# Patient Record
Sex: FEMALE | Race: WHITE | Employment: FULL TIME | URBAN - METROPOLITAN AREA
[De-identification: names, ages, dates, MRNs, and addresses within clinical notes are randomized per-mention and may not be internally consistent; named-entity substitution may affect disease eponyms.]

---

## 2020-08-12 ENCOUNTER — HOSPITAL ENCOUNTER (OUTPATIENT)
Age: 40
Discharge: HOME | End: 2020-08-14
Payer: COMMERCIAL

## 2020-08-12 ENCOUNTER — OFFICE VISIT (OUTPATIENT)
Dept: FAMILY MEDICINE CLINIC | Age: 40
End: 2020-08-12
Payer: COMMERCIAL

## 2020-08-12 VITALS
WEIGHT: 190 LBS | TEMPERATURE: 98.4 F | SYSTOLIC BLOOD PRESSURE: 124 MMHG | DIASTOLIC BLOOD PRESSURE: 62 MMHG | HEART RATE: 69 BPM | OXYGEN SATURATION: 98 %

## 2020-08-12 LAB
BILIRUBIN, POC: NORMAL
BLOOD URINE, POC: NORMAL
CLARITY, POC: CLEAR
COLOR, POC: YELLOW
CONTROL: NORMAL
GLUCOSE URINE, POC: NORMAL
KETONES, POC: NORMAL
LEUKOCYTE EST, POC: NORMAL
NITRITE, POC: NORMAL
PH, POC: 6.5
PREGNANCY TEST URINE, POC: NORMAL
PROTEIN, POC: NORMAL
SPECIFIC GRAVITY, POC: 1.02
UROBILINOGEN, POC: 1

## 2020-08-12 PROCEDURE — 81025 URINE PREGNANCY TEST: CPT | Performed by: PHYSICIAN ASSISTANT

## 2020-08-12 PROCEDURE — G8427 DOCREV CUR MEDS BY ELIG CLIN: HCPCS | Performed by: PHYSICIAN ASSISTANT

## 2020-08-12 PROCEDURE — 87088 URINE BACTERIA CULTURE: CPT

## 2020-08-12 PROCEDURE — 99214 OFFICE O/P EST MOD 30 MIN: CPT | Performed by: PHYSICIAN ASSISTANT

## 2020-08-12 PROCEDURE — G8421 BMI NOT CALCULATED: HCPCS | Performed by: PHYSICIAN ASSISTANT

## 2020-08-12 PROCEDURE — 1036F TOBACCO NON-USER: CPT | Performed by: PHYSICIAN ASSISTANT

## 2020-08-12 PROCEDURE — 81002 URINALYSIS NONAUTO W/O SCOPE: CPT | Performed by: PHYSICIAN ASSISTANT

## 2020-08-12 RX ORDER — METHYLPREDNISOLONE 4 MG/1
TABLET ORAL
Qty: 1 KIT | Refills: 0 | Status: SHIPPED
Start: 2020-08-12 | End: 2020-11-09 | Stop reason: ALTCHOICE

## 2020-08-12 RX ORDER — METHOCARBAMOL 750 MG/1
750 TABLET, FILM COATED ORAL 4 TIMES DAILY
Qty: 20 TABLET | Refills: 0 | Status: SHIPPED | OUTPATIENT
Start: 2020-08-12 | End: 2020-08-17

## 2020-08-12 SDOH — HEALTH STABILITY: MENTAL HEALTH: HOW OFTEN DO YOU HAVE A DRINK CONTAINING ALCOHOL?: NEVER

## 2020-08-12 NOTE — PROGRESS NOTES
Known Allergies    Social History:     Social History     Tobacco Use    Smoking status: Never Smoker    Smokeless tobacco: Never Used   Substance Use Topics    Alcohol use: Never     Frequency: Never    Drug use: Never       Patient lives at home. Physical Exam:     Vitals:    08/12/20 1556   BP: 124/62   Pulse: 69   Temp: 98.4 °F (36.9 °C)   SpO2: 98%   Weight: 190 lb (86.2 kg)       Exam:  Physical Exam  Nurses note and vital signs reviewed and patient is not hypoxic. General: The patient appears well and in no apparent distress. Patient is resting comfortably on cart. Skin: Warm, dry, no pallor noted. There is no rash noted. Head: Normocephalic, atraumatic. Eye: Normal conjunctiva  Ears, Nose, Mouth, and Throat: Oral mucosa is moist  Cardiovascular: Regular Rate and Rhythm  Respiratory: Patient is in no distress, no accessory muscle use, lungs are clear to auscultation, no wheezing, crackles or rhonchi  Back: Non-tender, no CVA tenderness bilaterally to percussion. GI: Normal bowel sounds, no tenderness to palpation, no masses appreciated. No rebound, guarding, or rigidity noted. Musculoskeletal: The patient has no evidence of calf tenderness, no pitting edema, symmetrical pulses noted bilaterally  Neurological: A&O x4, normal speech        Testing:     Results for orders placed or performed in visit on 08/12/20   POCT urine pregnancy   Result Value Ref Range    Preg Test, Ur neg     Control     POCT Urinalysis no Micro   Result Value Ref Range    Color, UA yellow     Clarity, UA clear     Glucose, UA POC neg     Bilirubin, UA neg     Ketones, UA neg     Spec Grav, UA 1.020     Blood, UA POC trace-lysed     pH, UA 6.5     Protein, UA POC neg     Urobilinogen, UA 1.0     Leukocytes, UA neg     Nitrite, UA neg            Medical Decision Making:     Vital signs reviewed    Past medical history reviewed. Allergies reviewed. Medications reviewed.     Patient on arrival does not appear to be in

## 2020-08-15 LAB — URINE CULTURE, ROUTINE: NORMAL

## 2020-11-09 ENCOUNTER — TELEPHONE (OUTPATIENT)
Dept: ADMINISTRATIVE | Age: 40
End: 2020-11-09

## 2020-11-09 ENCOUNTER — OFFICE VISIT (OUTPATIENT)
Dept: FAMILY MEDICINE CLINIC | Age: 40
End: 2020-11-09
Payer: COMMERCIAL

## 2020-11-09 VITALS
WEIGHT: 189 LBS | HEART RATE: 70 BPM | TEMPERATURE: 97.3 F | OXYGEN SATURATION: 99 % | BODY MASS INDEX: 30.37 KG/M2 | HEIGHT: 66 IN | RESPIRATION RATE: 18 BRPM | DIASTOLIC BLOOD PRESSURE: 72 MMHG | SYSTOLIC BLOOD PRESSURE: 122 MMHG

## 2020-11-09 PROCEDURE — G8427 DOCREV CUR MEDS BY ELIG CLIN: HCPCS | Performed by: PHYSICIAN ASSISTANT

## 2020-11-09 PROCEDURE — G8484 FLU IMMUNIZE NO ADMIN: HCPCS | Performed by: PHYSICIAN ASSISTANT

## 2020-11-09 PROCEDURE — 99213 OFFICE O/P EST LOW 20 MIN: CPT | Performed by: PHYSICIAN ASSISTANT

## 2020-11-09 PROCEDURE — 1036F TOBACCO NON-USER: CPT | Performed by: PHYSICIAN ASSISTANT

## 2020-11-09 PROCEDURE — G8419 CALC BMI OUT NRM PARAM NOF/U: HCPCS | Performed by: PHYSICIAN ASSISTANT

## 2020-11-09 RX ORDER — CEFDINIR 300 MG/1
300 CAPSULE ORAL 2 TIMES DAILY
Qty: 20 CAPSULE | Refills: 0 | Status: SHIPPED | OUTPATIENT
Start: 2020-11-09 | End: 2020-11-19

## 2020-11-09 RX ORDER — METHYLPREDNISOLONE 4 MG/1
TABLET ORAL
Qty: 1 KIT | Refills: 0 | Status: SHIPPED
Start: 2020-11-09 | End: 2020-11-16 | Stop reason: ALTCHOICE

## 2020-11-09 RX ORDER — CHLORPHENIRAMINE MALEATE 4 MG/1
4 TABLET ORAL EVERY 6 HOURS PRN
Qty: 20 TABLET | Refills: 0 | Status: SHIPPED
Start: 2020-11-09 | End: 2020-11-16 | Stop reason: ALTCHOICE

## 2020-11-09 NOTE — PROGRESS NOTES
Never    Drug use: Never       Patient lives at home. Physical Exam:     Vitals:    11/09/20 1641   BP: 122/72   Pulse: 70   Resp: 18   Temp: 97.3 °F (36.3 °C)   SpO2: 99%   Weight: 189 lb (85.7 kg)   Height: 5' 6\" (1.676 m)       Exam:  Physical Exam  Nurse's notes and vital signs reviewed. The patient is not hypoxic. ? General: Alert, no acute distress, patient resting comfortably Patient is not toxic or lethargic. Skin: Warm, intact, no pallor noted. There is no evidence of rash at this time. Head: Normocephalic, atraumatic  Eye: Normal conjunctiva  Ears, Nose, Throat: Right tympanic membrane clear, left tympanic membrane clear. No drainage or discharge noted. No pre- or post-auricular tenderness, erythema, or swelling noted. Nasal congestion  Posterior oropharynx shows erythema but no evidence of tonsillar hypertrophy, or exudate. the uvula is midline. No trismus or drooling is noted. Moist mucous membranes. Neck: No anterior/posterior lymphadenopathy noted. No erythema, no masses, no fluctuance or induration noted. No meningeal signs. Cardiovascular: Regular Rate and Rhythm  Respiratory: No acute distress, no rhonchi, wheezing or crackles noted. No stridor or retractions are noted. Neurological: A&O x4, normal speech  Psychiatric: Cooperative         Testing:           Medical Decision Making:     The patient has been seen and evaluated. The vital signs have been reviewed. The patient does not appear to be toxic or lethargic. Patient will be treated with Omnicef, chlorphentermine and Medrol Dosepak    The patient was educated on the proper dosage of motrin and tylenol and the appropriate intervals of each. The patient is to increase fluid intake over the next several days. The patient is to return to express care or go directly to the emergency department should any of the signs or symptoms worsen. The patient is to followup with primary care physician in 2-3 days for repeat evaluation.  The patient has no other questions or concerns at this time the patient will be discharged home. Clinical Impression:   Governor Neville GONZALEZ was seen today for sinus problem. Diagnoses and all orders for this visit:    Acute non-recurrent sinusitis, unspecified location    Nasal congestion    Other orders  -     cefdinir (OMNICEF) 300 MG capsule; Take 1 capsule by mouth 2 times daily for 10 days  -     methylPREDNISolone (MEDROL DOSEPACK) 4 MG tablet; Take by mouth.  -     chlorpheniramine (ALLER-CHLOR) 4 MG tablet; Take 1 tablet by mouth every 6 hours as needed for Allergies        The patient is to call for any concerns or return if any of the signs or symptoms worsen. The patient is to follow-up with PCP in the next 2-3 days for repeat evaluation repeat assessment or go directly to the emergency department.      SIGNATURE: Ramiro Bartlett III, PA-C

## 2020-11-09 NOTE — TELEPHONE ENCOUNTER
Pt called stating she may have sinus infection. Pt began feeling unwell on Saturday and has the following symptoms:  Sore throat, sneezing, sinus pressure, ear pressure and perhaps a fever.  (she did not take it)    Pt was transferred to office, option 4.

## 2020-11-16 ENCOUNTER — OFFICE VISIT (OUTPATIENT)
Dept: FAMILY MEDICINE CLINIC | Age: 40
End: 2020-11-16
Payer: COMMERCIAL

## 2020-11-16 ENCOUNTER — TELEPHONE (OUTPATIENT)
Dept: ADMINISTRATIVE | Age: 40
End: 2020-11-16

## 2020-11-16 VITALS
RESPIRATION RATE: 16 BRPM | TEMPERATURE: 97.2 F | BODY MASS INDEX: 29.25 KG/M2 | HEIGHT: 66 IN | DIASTOLIC BLOOD PRESSURE: 78 MMHG | OXYGEN SATURATION: 98 % | HEART RATE: 78 BPM | SYSTOLIC BLOOD PRESSURE: 122 MMHG | WEIGHT: 182 LBS

## 2020-11-16 PROCEDURE — G8484 FLU IMMUNIZE NO ADMIN: HCPCS | Performed by: PHYSICIAN ASSISTANT

## 2020-11-16 PROCEDURE — G8427 DOCREV CUR MEDS BY ELIG CLIN: HCPCS | Performed by: PHYSICIAN ASSISTANT

## 2020-11-16 PROCEDURE — G8417 CALC BMI ABV UP PARAM F/U: HCPCS | Performed by: PHYSICIAN ASSISTANT

## 2020-11-16 PROCEDURE — 99213 OFFICE O/P EST LOW 20 MIN: CPT | Performed by: PHYSICIAN ASSISTANT

## 2020-11-16 PROCEDURE — 1036F TOBACCO NON-USER: CPT | Performed by: PHYSICIAN ASSISTANT

## 2020-11-16 RX ORDER — METHOCARBAMOL 750 MG/1
750 TABLET, FILM COATED ORAL 4 TIMES DAILY
Qty: 40 TABLET | Refills: 0 | Status: SHIPPED | OUTPATIENT
Start: 2020-11-16 | End: 2020-11-26

## 2020-11-16 NOTE — TELEPHONE ENCOUNTER
Pt states over weekend she began feeling tremendous amount of pain down her back and legs. Pt states she did not injure herself, she thinks it may be a pinched nerve. Also please be advised,  Pt states cannot come in, she is going for a covid test today.       Thanks

## 2020-11-16 NOTE — TELEPHONE ENCOUNTER
Friday afternoon started feeling \"tingly\" and having some numbness. Saturday and Sunday worsening, unable to walk, waking up in the middle of the night. Patient will come to walk in care.

## 2020-11-18 ENCOUNTER — TELEPHONE (OUTPATIENT)
Dept: PRIMARY CARE CLINIC | Age: 40
End: 2020-11-18

## 2020-11-18 ENCOUNTER — OFFICE VISIT (OUTPATIENT)
Dept: CHIROPRACTIC MEDICINE | Age: 40
End: 2020-11-18
Payer: COMMERCIAL

## 2020-11-18 VITALS
SYSTOLIC BLOOD PRESSURE: 100 MMHG | OXYGEN SATURATION: 98 % | HEART RATE: 66 BPM | DIASTOLIC BLOOD PRESSURE: 62 MMHG | HEIGHT: 66 IN | RESPIRATION RATE: 14 BRPM | BODY MASS INDEX: 29.25 KG/M2 | WEIGHT: 182 LBS | TEMPERATURE: 97.7 F

## 2020-11-18 PROCEDURE — 1036F TOBACCO NON-USER: CPT | Performed by: CHIROPRACTOR

## 2020-11-18 PROCEDURE — G8427 DOCREV CUR MEDS BY ELIG CLIN: HCPCS | Performed by: CHIROPRACTOR

## 2020-11-18 PROCEDURE — 99203 OFFICE O/P NEW LOW 30 MIN: CPT | Performed by: CHIROPRACTOR

## 2020-11-18 PROCEDURE — G8484 FLU IMMUNIZE NO ADMIN: HCPCS | Performed by: CHIROPRACTOR

## 2020-11-18 PROCEDURE — G8417 CALC BMI ABV UP PARAM F/U: HCPCS | Performed by: CHIROPRACTOR

## 2020-11-18 RX ORDER — OXYCODONE HYDROCHLORIDE AND ACETAMINOPHEN 5; 325 MG/1; MG/1
1 TABLET ORAL EVERY 6 HOURS PRN
Qty: 9 TABLET | Refills: 0 | Status: SHIPPED | OUTPATIENT
Start: 2020-11-18 | End: 2020-11-21

## 2020-11-18 ASSESSMENT — ENCOUNTER SYMPTOMS
SHORTNESS OF BREATH: 0
BACK PAIN: 1
BOWEL INCONTINENCE: 0

## 2020-11-18 NOTE — PATIENT INSTRUCTIONS
LUMBAR EXTENSION EXERCISES  TIPS FOR PERFORMING THESE EXERCISES   Centralization:  o The closer the pain is to your spine, the better. (e.g. the pain is better in your knee than  in your foot). o An increase in your low back pain may occur. This is acceptable as long as your  leg symptoms are not increasing. Stop the exercise and let your doctor or therapist know right away if you have any of  these problems:  -Any increase in weakness in your leg or foot.  -Pain begins to travel further down the leg. For any back injury, if you experience a loss of bowel or bladder control, or you develop saddle anesthesia, go to the emergency department of your local hospital.    Saddle anesthesia is a loss of sensation (anesthesia) restricted to the area of the buttocks, perineum and inner surfaces of the thighs. It is frequently associated with the spine-related injury cauda equina syndrome. PRONE EXTENSION (POSITIONED)          o Lie on stomach with pillows under chest for comfort  o   o Hold position for 3-5 mins  o Option: Squeeze your buttocks  together as tightly as possible. PRONE EXTENSION ON ELBOWS    o Keep your back and buttocks relaxed and rise up on elbows as high as  possible. o Concentrate on keeping your hips down  o Repeat exercise ______ times. o Hold position for 3-5 mins      OR     PRONE PRESS-UPS        o Place hands beside shoulders  o Keep your back and buttocks relaxed and use your arms to press  up.  o Concentrate on keeping your hips down  o Push up your upper body as high as possible. o Repeat exercise 10 times. Every waking hour or for \"First Aid\"      OR    STANDING EXTENSION        o Stand with your feet apart and hands on the small of your back with  fingers pointing backwards. o Bend backwards at the waist, supporting the trunk with your hands. o Keep your knees straight.  o Repeat exercise 10 times. o Option: Perform with back against countertop for support. SIDEGLIDE IN STANDING        o Stand at a right angle to the wall about 2 feet out from the  wall.  o Lean your ______ shoulder into the wall.  o Move your ______ hips toward the wall, keeping your legs  together and your knees straight.  o Return to the starting position. o Repeat exercise ______ times. o Hold position for ______ seconds.

## 2020-11-18 NOTE — TELEPHONE ENCOUNTER
I called and spoke with the patient. We schedule an appointment today with chiropractor. Also try to set her up with a MRI.

## 2020-11-18 NOTE — PROGRESS NOTES
[facility]    20  Rocio Gates : 1980 Sex: female  Age: 41 y.o. Patient was referred by Davey Leigh DO    Chief Complaint   Patient presents with    Lower Back Pain     Right sided lower back pain. Has been bothersome since Friday. No known injury. 50/50 back to leg pain  No prior hx of LBP that required tx  Taking percocet, but pain still about 6/10    Sitting still = no pain    Feels like numbness from epidural after giving birth to child. 1 previous chiropractic visit. She was seen through [facility] on Monday and placed on medications. She previously was on a Medrol dose pack for sinus infection. She has been scheduled for an MRI to be performed on Saturday. She has not been working but feels she is able to. She has to drive to [place] which makes things worse. Then she is on her feet. Back Pain   This is a new problem. The current episode started in the past 7 days. The pain is present in the lumbar spine. The quality of the pain is described as aching (Numbness in the leg). The pain radiates to the right foot. The pain is at a severity of 6/10. The symptoms are aggravated by sitting and position (lifting leg, transition from sitting, forward bending). Associated symptoms include leg pain and numbness. Pertinent negatives include no bladder incontinence, bowel incontinence or fever. She has tried ice, heat, muscle relaxant, analgesics and NSAIDs (percocet) for the symptoms. Red Flags:  none    Review of Systems   Constitutional: Negative for chills and fever. Respiratory: Negative for shortness of breath. Gastrointestinal: Negative for bowel incontinence. Genitourinary: Negative for bladder incontinence. Musculoskeletal: Positive for back pain. Neurological: Positive for numbness.          Current Outpatient Medications:     oxyCODONE-acetaminophen (PERCOCET) 5-325 MG per tablet, Take 1 tablet by mouth every 6 hours as needed for abused: Not on file     Forced sexual activity: Not on file   Other Topics Concern    Not on file   Social History Narrative    Not on file       Vitals:    11/18/20 1531   BP: 100/62   Site: Left Upper Arm   Position: Sitting   Pulse: 66   Resp: 14   Temp: 97.7 °F (36.5 °C)   TempSrc: Temporal   SpO2: 98%   Weight: 182 lb (82.6 kg)   Height: 5' 6\" (1.676 m)       EXAM:  Appears well. No acute distress. Oriented x3. Ambulates without difficulty. No antalgia or list.  Able to arise onto the toes and single-leg stance. Lumbar flexion: 60/60 endrange pain and leg pain  Lumbar extension: 20/25  Right lateral flexion: 15/25  Left lateral flexion: 15/25    Reflexes are +2/5 and symmetrical at the Patella and +2 left Achilles, +1 right Achilles. Manual muscle testing reveals: 5/5 strength to the LE indicator muscles. Sensation to light touch is diminished over the right lateral foot compared to left otherwise WNL to the distal lower extremity dermatomes. Posterior tibial pulses 2/4 B/L. Plantar response reveals down-going toes b/l. Valsalva's: Negative   Seated SLR's: Positive Right Lower Extremity  Oconnor's:  Negative Bilateral  Slump:  Positive Right Lower Extremity  Prone Lying: relief of back and leg pain  Prone Lying in extension: Decreased/better  Lateral shifts were unremarkable. Repeated Extensions in Lying x10: Decreased during loading/centralizing after loading  In a seated position, slouch testing increases back and leg pain. Tension relieves    Midline pain: Positive for pain near L4-5 and L5-S1 interspaces. Taut and Tender fibers lumbar paraspinals B/L       Rocio GONZALEZ was seen today for lower back pain. Diagnoses and all orders for this visit:    Acute midline low back pain with right-sided sciatica        Treatment Plan:   I spoke with her regarding my exam findings and treatment options. Likely has right paracentral disc herniation at L4-5 or L5-S1.   We will know more after the MRI on Saturday. I recommended that we start a trial of conservative care today consisting home Rocío exercises. I will plan on seeing her 2 times per week for 3 weeks, then reassess to determine response to care and future options. With consent, I did begin today. Problem/Goals  Decrease pain and/or swelling    Today's Treatment  Therapeutic exercises via MDT were performed today for 10 minutes-4:00-4:10PM with MDT she gets complete relief of leg symptoms. She was provided with a home exercise sheet reinforcing these issues at home. She understands to stop should leg symptoms worsen or she develop weakness in the leg. Should she develop any saddle anesthesia or loss of bowel or bladder habit she needs to go directly to the emergency room. I will see her back next week after the MRI  I spoke to her regarding posttreatment soreness. Should any arise, she  may use ice for 10-15 minutes, over-the-counter NSAIDs or topical gels. Seen By:  Mary Walker DC    * This note was created using voice recognition software.   The note was reviewed, however grammatical errors may exist.

## 2020-11-18 NOTE — TELEPHONE ENCOUNTER
Patient calling her pain is not letting up and getting worse. Starting at lower right buttock down back of right leg having trouble walking in her home from the pain. Also toes on right foot are starting to become numb. She is not sure what to do. She is taking the meds as directed, stretching, hot/cold compress. She is not sure what the ER would due for her if she went there if this is a pinched nerve.

## 2020-11-19 ENCOUNTER — HOSPITAL ENCOUNTER (EMERGENCY)
Age: 40
Discharge: HOME | End: 2020-11-19
Attending: EMERGENCY MEDICINE
Payer: COMMERCIAL

## 2020-11-19 VITALS
HEART RATE: 80 BPM | HEIGHT: 66 IN | TEMPERATURE: 96.6 F | SYSTOLIC BLOOD PRESSURE: 147 MMHG | OXYGEN SATURATION: 100 % | BODY MASS INDEX: 29.25 KG/M2 | RESPIRATION RATE: 16 BRPM | WEIGHT: 182 LBS | DIASTOLIC BLOOD PRESSURE: 82 MMHG

## 2020-11-19 PROCEDURE — 96372 THER/PROPH/DIAG INJ SC/IM: CPT

## 2020-11-19 PROCEDURE — 6360000002 HC RX W HCPCS: Performed by: EMERGENCY MEDICINE

## 2020-11-19 PROCEDURE — 99284 EMERGENCY DEPT VISIT MOD MDM: CPT

## 2020-11-19 RX ORDER — KETOROLAC TROMETHAMINE 30 MG/ML
30 INJECTION, SOLUTION INTRAMUSCULAR; INTRAVENOUS ONCE
Status: COMPLETED | OUTPATIENT
Start: 2020-11-19 | End: 2020-11-19

## 2020-11-19 RX ORDER — ORPHENADRINE CITRATE 30 MG/ML
60 INJECTION INTRAMUSCULAR; INTRAVENOUS ONCE
Status: COMPLETED | OUTPATIENT
Start: 2020-11-19 | End: 2020-11-19

## 2020-11-19 RX ORDER — PREDNISONE 20 MG/1
60 TABLET ORAL DAILY
Qty: 15 TABLET | Refills: 0 | Status: SHIPPED | OUTPATIENT
Start: 2020-11-19 | End: 2020-11-24

## 2020-11-19 RX ORDER — CYCLOBENZAPRINE HCL 10 MG
10 TABLET ORAL 3 TIMES DAILY PRN
Qty: 30 TABLET | Refills: 0 | Status: SHIPPED | OUTPATIENT
Start: 2020-11-19 | End: 2020-11-29

## 2020-11-19 RX ORDER — OXYCODONE HYDROCHLORIDE AND ACETAMINOPHEN 5; 325 MG/1; MG/1
1 TABLET ORAL EVERY 6 HOURS PRN
Qty: 12 TABLET | Refills: 0 | Status: SHIPPED | OUTPATIENT
Start: 2020-11-19 | End: 2020-11-22

## 2020-11-19 RX ADMIN — KETOROLAC TROMETHAMINE 30 MG: 30 INJECTION, SOLUTION INTRAMUSCULAR at 02:57

## 2020-11-19 RX ADMIN — ORPHENADRINE CITRATE 60 MG: 30 INJECTION INTRAMUSCULAR; INTRAVENOUS at 02:58

## 2020-11-19 ASSESSMENT — PAIN SCALES - GENERAL
PAINLEVEL_OUTOF10: 7
PAINLEVEL_OUTOF10: 9

## 2020-11-19 ASSESSMENT — PAIN DESCRIPTION - PAIN TYPE: TYPE: ACUTE PAIN

## 2020-11-19 ASSESSMENT — PAIN DESCRIPTION - LOCATION: LOCATION: BACK;LEG

## 2020-11-19 ASSESSMENT — PAIN DESCRIPTION - ORIENTATION: ORIENTATION: RIGHT

## 2020-11-19 NOTE — ED PROVIDER NOTES
Department of Emergency Medicine   ED  Provider Note  Admit Date/RoomTime: 11/19/2020  2:40 AM  ED Room: 22/22          History of Present Illness:  11/19/20, Time: 2:43 AM EST  Chief Complaint   Patient presents with    Back Pain     radiates down right leg, numbness to middle toes                Kelly Salvador is a 41 y.o. female presenting to the ED for right lower back, beginning approximately  1 week ago. The complaint has been persistent, moderate in severity, and worsened by changing position. Patient states that approximately 1 week ago she began to have right-sided lower back pain that radiated to the buttocks. She states that she was given muscle relaxer and ibuprofen with no improvement. Patient has been evaluated by the chiropractor as well, has an MRI scheduled for Saturday. She states that this morning she was very uncomfortable while attempting to sleep. She states that the burning pain is now radiating down the lateral leg and into the foot, was told that if symptoms worsen she would need evaluated. She denies any weakness to the leg, still describes a dull pain to the midline lumbar spine, no recent fall or trauma. She denies any saddle paresthesia, no urine/fecal incontinence/retention. She has had no fevers, no other discomfort. Review of Systems:   Pertinent positives and negatives are stated within HPI, all other systems reviewed and are negative.        --------------------------------------------- PAST HISTORY ---------------------------------------------  Past Medical History:  has a past medical history of Kidney stones. Past Surgical History:  has a past surgical history that includes Dilation and curettage of uterus (2010). Social History:  reports that she has never smoked. She has never used smokeless tobacco. She reports current alcohol use. She reports that she does not use drugs. Family History: family history is not on file. . Unless otherwise noted, family history is non contributory    The patients home medications have been reviewed. Allergies: Patient has no known allergies. ---------------------------------------------------PHYSICAL EXAM--------------------------------------    Constitutional/General: Alert and oriented x3  Head: Normocephalic and atraumatic  Eyes: PERRL, EOMI, sclera non icteric  Mouth: Oropharynx clear, handling secretions, no trismus, no asymmetry of the posterior oropharynx or uvular edema  Neck: Supple, full ROM, no stridor, no meningeal signs  Respiratory: Lungs clear to auscultation bilaterally, no wheezes, rales, or rhonchi. Not in respiratory distress  Cardiovascular:  Regular rate. Regular rhythm. No murmurs, no aortic murmurs, no gallops, or rubs. 2+ distal pulses. Equal extremity pulses. Chest: No chest wall tenderness  GI:  Abdomen Soft, Non tender, Non distended. No rebound, guarding, or rigidity. No pulsatile masses. Musculoskeletal: Moves all extremities x 4. Warm and well perfused, no clubbing, cyanosis, or edema. Capillary refill <3 seconds  Integument: skin warm and dry. No rashes. Neurologic: GCS 15, no focal deficits, symmetric strength 5/5 in the upper and lower extremities bilaterally  Psychiatric: Normal Affect          -------------------------------------------------- RESULTS -------------------------------------------------  I have personally reviewed all laboratory and imaging results for this patient. Results are listed below.      LABS: (Lab results interpreted by me)  No results found for this visit on 11/19/20.,       RADIOLOGY:  Interpreted by Radiologist unless otherwise specified  No orders to display             ------------------------- NURSING NOTES AND VITALS REVIEWED ---------------------------   The nursing notes within the ED encounter and vital signs as below have been reviewed by myself  BP (!) 147/82   Pulse 80   Temp 96.6 °F (35.9 °C) (Temporal)   Resp 16   Ht 5' 6\" (1.676 m)   Wt voice recognition software.  Every effort was made to ensure accuracy; however, inadvertent computerized transcription errors may be present        Torres Oviedo DO  11/19/20 9160

## 2020-11-21 ENCOUNTER — HOSPITAL ENCOUNTER (OUTPATIENT)
Dept: MRI IMAGING | Age: 40
Discharge: HOME | End: 2020-11-23
Payer: COMMERCIAL

## 2020-11-21 PROCEDURE — 72148 MRI LUMBAR SPINE W/O DYE: CPT

## 2020-11-23 ENCOUNTER — OFFICE VISIT (OUTPATIENT)
Dept: CHIROPRACTIC MEDICINE | Age: 40
End: 2020-11-23
Payer: COMMERCIAL

## 2020-11-23 VITALS — TEMPERATURE: 97 F

## 2020-11-23 PROCEDURE — 98940 CHIROPRACT MANJ 1-2 REGIONS: CPT | Performed by: CHIROPRACTOR

## 2020-11-23 NOTE — PROGRESS NOTES
20  Zaid Gates : 1980 Sex: female  Age: 41 y.o. Chief Complaint   Patient presents with    Back Pain     lower back       HPI: She states after her initial visit with me, she went home and during the night felt severe pain in the right lower extremity. She ended up going to the emergency room. She was placed on some medications and is doing better now. She states actually today her symptoms are minimal.  She did have her MRI and was contacted by Mancil Severance PA with results. She has been referred to neurosurgeon. She did finish her prednisone taper prescribed by the ER. She is taking Flexeril now as well. Current Outpatient Medications:     cyclobenzaprine (FLEXERIL) 10 MG tablet, Take 1 tablet by mouth 3 times daily as needed for Muscle spasms, Disp: 30 tablet, Rfl: 0    predniSONE (DELTASONE) 20 MG tablet, Take 3 tablets by mouth daily for 5 days, Disp: 15 tablet, Rfl: 0    oxaprozin (DAYPRO) 600 MG tablet, Take 1 tablet by mouth 2 times daily for 7 days Take on full stomach and with a full glass of water, Disp: 14 tablet, Rfl: 0    methocarbamol (ROBAXIN-750) 750 MG tablet, Take 1 tablet by mouth 4 times daily for 10 days, Disp: 40 tablet, Rfl: 0    Exam:   Vitals:    20 1629   Temp: 97 °F (36.1 °C)     She appears well. No distress. Alert and oriented x3. Ambulates without assistance. Sensation to light touch is diminished over the right dorsal foot compared to left otherwise WNL. Posterior tibial pulses 2/4. Prone lying relieves symptoms. Prone lying in extension, repeated extensions in lying-decrease symptoms during loading, better after. There are hypertonic and tender fibers noted today in the bilateral lumbar paraspinal muscles. Joint fixation is noted with motion screening at bilateral SI joints. Martha GONZALEZ was seen today for back pain.     Diagnoses and all orders for this visit:    Bulging of lumbar intervertebral disc        Treatment Plan: Reviewed

## 2020-12-02 ENCOUNTER — OFFICE VISIT (OUTPATIENT)
Dept: CHIROPRACTIC MEDICINE | Age: 40
End: 2020-12-02
Payer: COMMERCIAL

## 2020-12-02 VITALS
TEMPERATURE: 97.9 F | OXYGEN SATURATION: 98 % | HEIGHT: 66 IN | BODY MASS INDEX: 29.25 KG/M2 | HEART RATE: 85 BPM | RESPIRATION RATE: 14 BRPM | WEIGHT: 182 LBS

## 2020-12-02 PROCEDURE — 1036F TOBACCO NON-USER: CPT | Performed by: CHIROPRACTOR

## 2020-12-02 PROCEDURE — G8484 FLU IMMUNIZE NO ADMIN: HCPCS | Performed by: CHIROPRACTOR

## 2020-12-02 PROCEDURE — G8417 CALC BMI ABV UP PARAM F/U: HCPCS | Performed by: CHIROPRACTOR

## 2020-12-02 PROCEDURE — 99213 OFFICE O/P EST LOW 20 MIN: CPT | Performed by: CHIROPRACTOR

## 2020-12-02 PROCEDURE — G8427 DOCREV CUR MEDS BY ELIG CLIN: HCPCS | Performed by: CHIROPRACTOR

## 2020-12-02 NOTE — PROGRESS NOTES
20  Ad Gates : 1980 Sex: female  Age: 41 y.o. Chief Complaint   Patient presents with    Lower Back Pain       HPI:   Pain is has improved. She states since I last saw her, she may have had 1 or 2 episodes of pain which traveled into the right lateral thigh. She has decreased her exercises. She is feeling pretty good. Wonders about walking again for exercise. She has been traveling in the car with a towel behind her back which helps. She does have an area of numbness identified in the right lower leg laterally and in the dorsum of the foot. She has not seen either neurosurgery or pain management yet. Current Outpatient Medications:     oxaprozin (DAYPRO) 600 MG tablet, Take 1 tablet by mouth 2 times daily for 7 days Take on full stomach and with a full glass of water, Disp: 14 tablet, Rfl: 0    Exam:   Vitals:    20 1520   Pulse: 85   Resp: 14   Temp: 97.9 °F (36.6 °C)   SpO2: 98%     She appears well. No distress. She squats fully and arises without pain. She exhibits full, active pain-free range of motion of the lumbar spine. She has mild midline tenderness L4-5 interspace today. Reflexes are +2 and symmetrical at the knees and ankles. Plantar response reveals downgoing toes. Posterior tibial pulses 2/4. There is a loss of sensation first interspace right foot compared to left. Otherwise sensation WNL to the distal lower extremities. EHL strength +4/5 right versus 5/5 left; otherwise 5/5 throughout the lower extremities. Seated and supine SLRs are unremarkable. Slump testing is negative bilaterally. Kemps testing negative bilaterally. Bowstring's and braggard's are both negative bilaterally. Chloe's testing to the right is mildly provocative, unremarkable left. Rocio GONZALEZ was seen today for lower back pain.     Diagnoses and all orders for this visit:    Bulging of lumbar intervertebral disc    Acute midline low back pain with right-sided

## 2021-01-07 ENCOUNTER — INITIAL CONSULT (OUTPATIENT)
Dept: NEUROSURGERY | Age: 41
End: 2021-01-07
Payer: COMMERCIAL

## 2021-01-07 DIAGNOSIS — M51.26 LUMBAR DISC HERNIATION: Primary | ICD-10-CM

## 2021-01-07 PROCEDURE — 1036F TOBACCO NON-USER: CPT | Performed by: PHYSICIAN ASSISTANT

## 2021-01-07 PROCEDURE — G8417 CALC BMI ABV UP PARAM F/U: HCPCS | Performed by: PHYSICIAN ASSISTANT

## 2021-01-07 PROCEDURE — G8428 CUR MEDS NOT DOCUMENT: HCPCS | Performed by: PHYSICIAN ASSISTANT

## 2021-01-07 PROCEDURE — G8484 FLU IMMUNIZE NO ADMIN: HCPCS | Performed by: PHYSICIAN ASSISTANT

## 2021-01-07 PROCEDURE — 99203 OFFICE O/P NEW LOW 30 MIN: CPT | Performed by: PHYSICIAN ASSISTANT

## 2021-01-07 ASSESSMENT — ENCOUNTER SYMPTOMS
GASTROINTESTINAL NEGATIVE: 1
RESPIRATORY NEGATIVE: 1
EYES NEGATIVE: 1
BACK PAIN: 1
ALLERGIC/IMMUNOLOGIC NEGATIVE: 1

## 2021-01-07 NOTE — PROGRESS NOTES
Subjective:      Patient ID: Aislinn Clements is a 41 y.o. female. Back Pain  This is a new problem. Episode onset: 2 months. The problem has been resolved since onset. The pain is present in the lumbar spine. The quality of the pain is described as aching. The patient is experiencing no pain. (Right leg numbness that has resolved) She has tried heat and muscle relaxant (prednisone, percocet) for the symptoms. The treatment provided mild relief. Review of Systems   Constitutional: Negative. HENT: Negative. Eyes: Negative. Respiratory: Negative. Cardiovascular: Negative. Gastrointestinal: Negative. Endocrine: Negative. Genitourinary: Negative. Musculoskeletal: Positive for back pain. Skin: Negative. Allergic/Immunologic: Negative. Neurological: Negative. Hematological: Negative. Psychiatric/Behavioral: Negative. Objective:   Physical Exam  Constitutional:       Appearance: Normal appearance. HENT:      Head: Normocephalic and atraumatic. Nose: Nose normal.   Eyes:      Pupils: Pupils are equal, round, and reactive to light. Pulmonary:      Effort: Pulmonary effort is normal.   Abdominal:      General: There is no distension. Skin:     General: Skin is warm and dry. Neurological:      Mental Status: She is alert. GCS: GCS eye subscore is 4. GCS verbal subscore is 5. GCS motor subscore is 6. Cranial Nerves: Cranial nerves are intact. Sensory: Sensation is intact. Motor: Motor function is intact. Gait: Gait is intact. Deep Tendon Reflexes: Reflexes are normal and symmetric. Psychiatric:         Mood and Affect: Mood normal.         Assessment:      41year old female who had a 1 week of severe low back pain 2 months ago that has resolved. Lumbar MRI reveals normal alignment. No significant disc herniation or stenosis. Plan:      No surgery indicated as her pain has resolved.         NAIF Salazar

## 2021-01-12 ENCOUNTER — OFFICE VISIT (OUTPATIENT)
Dept: PAIN MANAGEMENT | Age: 41
End: 2021-01-12
Payer: COMMERCIAL

## 2021-01-12 VITALS
RESPIRATION RATE: 16 BRPM | BODY MASS INDEX: 29.41 KG/M2 | TEMPERATURE: 97.5 F | SYSTOLIC BLOOD PRESSURE: 124 MMHG | DIASTOLIC BLOOD PRESSURE: 82 MMHG | WEIGHT: 183 LBS | HEIGHT: 66 IN | HEART RATE: 78 BPM

## 2021-01-12 DIAGNOSIS — M54.16 LUMBAR RADICULOPATHY: ICD-10-CM

## 2021-01-12 DIAGNOSIS — G89.4 CHRONIC PAIN SYNDROME: Primary | ICD-10-CM

## 2021-01-12 DIAGNOSIS — G89.29 CHRONIC RIGHT-SIDED LOW BACK PAIN WITH RIGHT-SIDED SCIATICA: ICD-10-CM

## 2021-01-12 DIAGNOSIS — G89.4 CHRONIC PAIN SYNDROME: ICD-10-CM

## 2021-01-12 DIAGNOSIS — M51.9 LUMBAR DISC DISORDER: ICD-10-CM

## 2021-01-12 DIAGNOSIS — M54.41 CHRONIC RIGHT-SIDED LOW BACK PAIN WITH RIGHT-SIDED SCIATICA: ICD-10-CM

## 2021-01-12 LAB — SPECIFIC GRAVITY UA: 1.02 (ref 1–1.03)

## 2021-01-12 PROCEDURE — G8484 FLU IMMUNIZE NO ADMIN: HCPCS | Performed by: PAIN MEDICINE

## 2021-01-12 PROCEDURE — 99204 OFFICE O/P NEW MOD 45 MIN: CPT

## 2021-01-12 PROCEDURE — 99204 OFFICE O/P NEW MOD 45 MIN: CPT | Performed by: PAIN MEDICINE

## 2021-01-12 PROCEDURE — G8417 CALC BMI ABV UP PARAM F/U: HCPCS | Performed by: PAIN MEDICINE

## 2021-01-12 PROCEDURE — G8427 DOCREV CUR MEDS BY ELIG CLIN: HCPCS | Performed by: PAIN MEDICINE

## 2021-01-12 PROCEDURE — 1036F TOBACCO NON-USER: CPT | Performed by: PAIN MEDICINE

## 2021-01-12 RX ORDER — GABAPENTIN 100 MG/1
CAPSULE ORAL
Qty: 69 CAPSULE | Refills: 0 | Status: SHIPPED
Start: 2021-01-12 | End: 2021-02-09 | Stop reason: SDUPTHER

## 2021-01-12 NOTE — PROGRESS NOTES
[facility]        [address]  [facility], [address] [place] [address]  Dept: [phone]        Patient:  Светлана Jaramillo,  1980    Date of Service:  21     Requesting Physician:  Delmar Ferraro    Reason for Consult:      Patient presents with complaints of right back pain that started 3 months ago    HISTORY OF PRESENT ILLNESS:      Pain is intermittent and is described as burning, sharp, pulling. Pain does radiate to right leg. She  has numbness, tingling, weakness of the right leg and does not have bladder or bowel dysfunction. Alleviating factors include: rest.  Aggravating factors include:  movement. She has not been on anticoagulation medications to include ASA, NSAIDS, Plavix, heparin, LMW heparin and warfarin and has not been on herbal supplements. She is not diabetic. Imagin lumbar MRI -  FINDINGS:   No fracture or malalignment of the lumbar spine.  Vertebral body height is   well maintained.  Conus medullaris is visualized and appears unremarkable. No epidural mass or hematoma. L1/L2: No central canal stenosis or neural foraminal narrowing. L2/L3: No central canal stenosis or neural foraminal narrowing.  Mild facet   hypertrophy present. L3/L4: Left foraminal broad-based disc herniation results in moderate left   neural foraminal narrowing.  No central canal stenosis. L4/L5: Mild circumferential disc bulge.  Mild right neural foraminal   narrowing with minimal effacement of exiting right L4 nerve root.  Moderate   bilateral facet hypertrophy notable on the right. L5/S1: No central canal stenosis or significant neural foraminal narrowing.       Impression   1.  Broad-based left foraminal disc bulge at L3/L4 results in moderate left   neural foraminal narrowing. 2.  Mild right neural foraminal narrowing at L4/L5 results in minimal   effacement of exiting right L4 nerve root. 3.  Moderate facet hypertrophy at L4/L5 more notable on the right. Previous treatments: chiro and medications. Past Medical History:   Diagnosis Date    Depression     Kidney stones        Past Surgical History:   Procedure Laterality Date    DILATION AND CURETTAGE OF UTERUS  2010       Prior to Admission medications    Not on File       No Known Allergies    Social History     Socioeconomic History    Marital status: Unknown     Spouse name: Not on file    Number of children: Not on file    Years of education: Not on file    Highest education level: Not on file   Occupational History    Not on file   Social Needs    Financial resource strain: Not on file    Food insecurity     Worry: Not on file     Inability: Not on file    Transportation needs     Medical: Not on file     Non-medical: Not on file   Tobacco Use    Smoking status: Former Smoker     Packs/day: 2.00     Years: 10.00     Pack years: 20.00     Quit date: 2006     Years since quitting: 15.0    Smokeless tobacco: Never Used   Substance and Sexual Activity    Alcohol use:  Yes     Alcohol/week: 3.0 standard drinks     Types: 3 Glasses of wine per week     Frequency: Never     Comment: occassional    Drug use: Never    Sexual activity: Not on file   Lifestyle    Physical activity     Days per week: Not on file     Minutes per session: Not on file    Stress: Not on file   Relationships    Social connections     Talks on phone: Not on file     Gets together: Not on file     Attends Nondenominational service: Not on file     Active member of club or organization: Not on file     Attends meetings of clubs or organizations: Not on file     Relationship status: Not on file    Intimate partner violence     Fear of current or ex partner: Not on file     Emotionally abused: Not on file     Physically abused: Not on file     Forced sexual activity: Not on file   Other Topics Concern    Not on file   Social History Narrative    Not on file Family History   Problem Relation Age of Onset    Hypertension Mother     No Known Problems Father     Cancer Paternal Aunt     Coronary Art Dis Maternal Grandmother        REVIEW OF SYSTEMS:     Patient specifically denies fever/chills, chest pain, shortness of breath, new bowel or bladder complaints. All other review of systems was negative. PHYSICAL EXAMINATION:      /82   Pulse 78   Temp 97.5 °F (36.4 °C)   Resp 16   Ht 5' 6\" (1.676 m)   Wt 183 lb (83 kg)   BMI 29.54 kg/m²     General:      General appearance:pleasant and well-hydrated, in no distress and A & O x3  Build:Normal Weight  Function:Rises from a seated position easily. HEENT:    Head:normocephalic, atraumatic  Pupils:regular, round, equal  Sclera: icterus absent    Lungs:    Breathing:normal breathing pattern    Abdomen:    Shape:non-distended and normal  Tenderness:none  Guarding:none    Lumbar spine:    Spine inspection:normal   CVA tenderness:No   Palpation:tenderness paravertebral muscles, left, right and positive. Range of motion:abnormal mildly in lateral bending, flexion, extension rotation bilateral and is painful. Musculoskeletal:    Trigger points in Paraveteral:absent bilaterally  SI joint tenderness:negative right, negative left              JAMARI test:not done right, not done             left  Piriformis tenderness:negative right, negative left  Trochanteric bursa tenderness:negative right, negative left  SLR:negative right, negative left, sitting     Extremities:    Tremors:None bilaterally upper and lower  Range of motion:pain with internal rotation of hips negative.   Intact:Yes  Varicose veins:absent   Pulses:present Lt radial  Cyanosis:none  Edema:none x all 4 extremities    Neurological:    Sensory:normal to light touch BLE except RLE in L5 distribution  Motor:                Right Quadriceps5/5          Left Quadriceps5/5           Right Gastrocnemius5/5    Left Gastrocnemius5/5  Right Ant Tibialis5/5 Left Ant Tibialis5/5    Reflexes:    Right Quadriceps reflex2+  Left Quadriceps reflex2+  Right Achilles reflex2+  Left Achilles reflex2+  Gait:normal    Dermatology:    Skin:no rashes or lesions noted    Impression:    LBP RLE pain in L4 and L5 distribution, initially severe in November but has improved since that time and is now intermittent  2020 lumbar MRI shows mild foraminal narrowing on the right affecting the L4 nerve root, moderate narrowing on the left at L3-4  Works for the [employer], , 2 children agres 15 and 10    Plan:    Reviewed referral documents and imaging  Urine screen today  OARRS report reviewed  Gabapentin 100 mg titration as tolerated up to tid, discussed common SE's  PT  Consider injection prn  Patient encouraged to stay active and to lose weight  Treatment plan discussed with the patient including medication and procedure side effects     CC:  Referring physician    SHER Goldsmith.

## 2021-01-12 NOTE — PROGRESS NOTES
Do you currently have any of the following:    Fever: No  Headache:  No  Cough: No  Shortness of breath: No  Exposed to anyone with these symptoms: No         Josi Chaparro Gates presents to the [facility]12[facility]. Rocio GONZALEZ is complaining of pain in her lower back and down the right leg. The pain is intermittent. The pain is described as sharp, burning, numb and pulling. Pain is rated on her best day at a 2, on her worst day at a 10, and on average at a 4 on the VAS scale. She took her last dose of Percocet in November. Pacemaker or defibrillator: No managed by N/A. She is not on NSAIDS and is not on anticoagulation medications. /82   Pulse 78   Temp 97.5 °F (36.4 °C)   Resp 16   Ht 5' 6\" (1.676 m)   Wt 183 lb (83 kg)   BMI 29.54 kg/m²      No LMP recorded. Patient has had an implant.

## 2021-01-14 LAB
Lab: NORMAL
REPORT: NORMAL
THIS TEST SENT TO: NORMAL

## 2021-01-15 LAB
7-AMINOCLONAZEPAM, URINE: <5 NG/ML
ALPHA-HYDROXYALPRAZOLAM, URINE: <5 NG/ML
ALPHA-HYDROXYMIDAZOLAM, URINE: <20 NG/ML
ALPRAZOLAM, URINE: <5 NG/ML
CHLORDIAZEPOXIDE, URINE: <20 NG/ML
CLONAZEPAM, URINE: <5 NG/ML
DIAZEPAM, URINE: <20 NG/ML
LORAZEPAM, URINE: <20 NG/ML
MIDAZOLAM, URINE: <20 NG/ML
NORDIAZEPAM, URINE: <20 NG/ML
OXAZEPAM, URINE: <20 NG/ML
TEMAZEPAM, URINE: <20 NG/ML

## 2021-01-16 LAB
6AM URINE: <10 NG/ML
CODEINE, URINE: <20 NG/ML
HYDROCODONE, URINE: <20 NG/ML
HYDROMORPHONE, URINE: <20 NG/ML
MORPHINE URINE: <20 NG/ML
NORHYDROCODONE, URINE: <20 NG/ML
NOROXYCODONE, URINE: <20 NG/ML
NOROXYMORPHONE, URINE: <20 NG/ML
OXYCODONE, URINE CONFIRMATION: <20 NG/ML
OXYMORPHONE, URINE: <20 NG/ML

## 2021-02-05 NOTE — PROGRESS NOTES
[facility]        [address]  [facility], [address] [facility]  Dept: [phone]        Follow up Note      Aislinn Clements     Date of Visit:  21     CC:  Patient presents for follow up   Chief Complaint   Patient presents with    Follow-up     lower back     HPI:    Pain is unchanged. Change in quality of symptoms:no. Medication side effects:none. Recent diagnostic testing:none. Recent interventional procedures:none. She has not been on anticoagulation medications to include ASA, NSAIDS, Plavix, heparin, LMW heparin and warfarin and has not been on herbal supplements. She is not diabetic.     Imagin lumbar MRI -  FINDINGS:   No fracture or malalignment of the lumbar spine.  Vertebral body height is   well maintained.  Conus medullaris is visualized and appears unremarkable. No epidural mass or hematoma. L1/L2: No central canal stenosis or neural foraminal narrowing. L2/L3: No central canal stenosis or neural foraminal narrowing.  Mild facet   hypertrophy present. L3/L4: Left foraminal broad-based disc herniation results in moderate left   neural foraminal narrowing.  No central canal stenosis. L4/L5: Mild circumferential disc bulge.  Mild right neural foraminal   narrowing with minimal effacement of exiting right L4 nerve root.  Moderate   bilateral facet hypertrophy notable on the right. L5/S1: No central canal stenosis or significant neural foraminal narrowing.       Impression   1.  Broad-based left foraminal disc bulge at L3/L4 results in moderate left   neural foraminal narrowing. 2.  Mild right neural foraminal narrowing at L4/L5 results in minimal   effacement of exiting right L4 nerve root.    3.  Moderate facet hypertrophy at L4/L5 more notable on the right.         Potential Aberrant Drug-Related Behavior: no    Urine Drug Screenin2021 consistent    OARRS report:  2021 consistent    Past Medical History:   Diagnosis Date  Depression     Kidney stones        Past Surgical History:   Procedure Laterality Date    DILATION AND CURETTAGE OF UTERUS  2010       Prior to Admission medications    Medication Sig Start Date End Date Taking? Authorizing Provider   gabapentin (NEURONTIN) 100 MG capsule qHS x 7 days then BID x 7 days then TID thereafter 1/12/21 2/11/21  Emre Rivers, DO       No Known Allergies    Social History     Socioeconomic History    Marital status: Unknown     Spouse name: Not on file    Number of children: Not on file    Years of education: Not on file    Highest education level: Not on file   Occupational History    Not on file   Social Needs    Financial resource strain: Not on file    Food insecurity     Worry: Not on file     Inability: Not on file    Transportation needs     Medical: Not on file     Non-medical: Not on file   Tobacco Use    Smoking status: Former Smoker     Packs/day: 2.00     Years: 10.00     Pack years: 20.00     Quit date: 2006     Years since quitting: 15.1    Smokeless tobacco: Never Used   Substance and Sexual Activity    Alcohol use:  Yes     Alcohol/week: 3.0 standard drinks     Types: 3 Glasses of wine per week     Frequency: Never     Comment: occassional    Drug use: Never    Sexual activity: Yes     Partners: Male   Lifestyle    Physical activity     Days per week: Not on file     Minutes per session: Not on file    Stress: Not on file   Relationships    Social connections     Talks on phone: Not on file     Gets together: Not on file     Attends Worship service: Not on file     Active member of club or organization: Not on file     Attends meetings of clubs or organizations: Not on file     Relationship status: Not on file    Intimate partner violence     Fear of current or ex partner: Not on file     Emotionally abused: Not on file     Physically abused: Not on file     Forced sexual activity: Not on file   Other Topics Concern    Not on file Social History Narrative    Not on file       Family History   Problem Relation Age of Onset    Hypertension Mother     No Known Problems Father     Cancer Paternal Aunt     Coronary Art Dis Maternal Grandmother        REVIEW OF SYSTEMS:     Rocio GONZALEZ denies fever/chills, chest pain, shortness of breath, new bowel or bladder complaints. All other review of systems was negative. PHYSICAL EXAMINATION:      /62   Pulse 78   Temp 97.7 °F (36.5 °C) (Infrared)   Resp 16   Ht 5' 6\" (1.676 m)   Wt 185 lb (83.9 kg)   SpO2 98%   BMI 29.86 kg/m²     General:       General appearance:pleasant and well-hydrated, in no distress and A & O x3  Build:Normal Weight  Function:Rises from a seated position easily.     HEENT:     Head:normocephalic, atraumatic  Pupils:regular, round, equal  Sclera: icterus absent     Lungs:     Breathing:normal breathing pattern     Abdomen:     Shape:non-distended and normal  Tenderness:none  Guarding:none     Lumbar spine:     Spine inspection:decreased lordosis  CVA tenderness:No   Palpation:tenderness paravertebral muscles, left, right positive. Range of motion:abnormal mildly in lateral bending, flexion, extension rotation bilateral and is painful.     Musculoskeletal:     Trigger points in Paraveteral:absent bilaterally  SI joint tenderness:negative right, negative left              JAMARI test:not done right, not done left  Piriformis tenderness:negative right, negative left  Trochanteric bursa tenderness:negative right, negative left  SLR:negative right, negative left, sitting      Extremities:     Tremors:None bilaterally upper and lower  Range of motion:pain with internal rotation of hips negative.   Intact:Yes  Varicose veins:absent   Pulses:present Lt radial  Cyanosis:none  Edema:none x all 4 extremities     Neurological:     Sensory:normal to light touch BLE except RLE in L5 distribution  Motor:                Right Quadriceps5/5          Left Quadriceps5/5 Right Gastrocnemius5/5    Left Gastrocnemius5/5  Right Ant Tibialis5/5  Left Ant Tibialis5/5     Reflexes:    Right Quadriceps reflex2+  Left Quadriceps reflex2+  Right Achilles reflex2+  Left Achilles reflex2+  Gait:normal station     Dermatology:     Skin:no rashes or lesions noted     Impression:     LBP RLE pain in L4 and L5 distribution, initially severe in November but has improved since that time and is now intermittent  2020 lumbar MRI shows mild foraminal narrowing on the right affecting the L4 nerve root, moderate narrowing on the left at L3-4  Works for the [employer], , 2 children agres 15 and 10     Plan:     Urine screen and OARRS report reviewed  RF gabapentin 100 mg TID, 2 RF - discussed common SE's (pt will call before next visit if she has any trouble with this medication)  PT - participating but is unaffordable  Consider injection prn  Patient encouraged to stay active  Treatment plan discussed with the patient including medication and procedure side effects     Cc:  Referring physician    SHER Vazquez.

## 2021-02-09 ENCOUNTER — OFFICE VISIT (OUTPATIENT)
Dept: PAIN MANAGEMENT | Age: 41
End: 2021-02-09
Payer: COMMERCIAL

## 2021-02-09 VITALS
TEMPERATURE: 97.7 F | BODY MASS INDEX: 29.73 KG/M2 | SYSTOLIC BLOOD PRESSURE: 115 MMHG | WEIGHT: 185 LBS | RESPIRATION RATE: 16 BRPM | OXYGEN SATURATION: 98 % | HEIGHT: 66 IN | DIASTOLIC BLOOD PRESSURE: 62 MMHG | HEART RATE: 78 BPM

## 2021-02-09 DIAGNOSIS — M51.9 LUMBAR DISC DISORDER: ICD-10-CM

## 2021-02-09 DIAGNOSIS — M54.41 CHRONIC RIGHT-SIDED LOW BACK PAIN WITH RIGHT-SIDED SCIATICA: ICD-10-CM

## 2021-02-09 DIAGNOSIS — G89.4 CHRONIC PAIN SYNDROME: Primary | ICD-10-CM

## 2021-02-09 DIAGNOSIS — G89.29 CHRONIC RIGHT-SIDED LOW BACK PAIN WITH RIGHT-SIDED SCIATICA: ICD-10-CM

## 2021-02-09 DIAGNOSIS — M54.16 LUMBAR RADICULOPATHY: ICD-10-CM

## 2021-02-09 PROCEDURE — G8427 DOCREV CUR MEDS BY ELIG CLIN: HCPCS | Performed by: PAIN MEDICINE

## 2021-02-09 PROCEDURE — G8417 CALC BMI ABV UP PARAM F/U: HCPCS | Performed by: PAIN MEDICINE

## 2021-02-09 PROCEDURE — 1036F TOBACCO NON-USER: CPT | Performed by: PAIN MEDICINE

## 2021-02-09 PROCEDURE — G8484 FLU IMMUNIZE NO ADMIN: HCPCS | Performed by: PAIN MEDICINE

## 2021-02-09 PROCEDURE — 99213 OFFICE O/P EST LOW 20 MIN: CPT | Performed by: PAIN MEDICINE

## 2021-02-09 PROCEDURE — 99214 OFFICE O/P EST MOD 30 MIN: CPT | Performed by: PAIN MEDICINE

## 2021-02-09 RX ORDER — GABAPENTIN 100 MG/1
100 CAPSULE ORAL 3 TIMES DAILY
Qty: 90 CAPSULE | Refills: 2 | Status: SHIPPED
Start: 2021-02-09 | End: 2021-03-30

## 2021-03-30 ENCOUNTER — OFFICE VISIT (OUTPATIENT)
Dept: PRIMARY CARE CLINIC | Age: 41
End: 2021-03-30
Payer: COMMERCIAL

## 2021-03-30 VITALS
SYSTOLIC BLOOD PRESSURE: 124 MMHG | HEIGHT: 66 IN | DIASTOLIC BLOOD PRESSURE: 80 MMHG | HEART RATE: 89 BPM | BODY MASS INDEX: 30.86 KG/M2 | TEMPERATURE: 97.3 F | OXYGEN SATURATION: 98 % | WEIGHT: 192 LBS | RESPIRATION RATE: 18 BRPM

## 2021-03-30 DIAGNOSIS — N92.6 MENSES, IRREGULAR: ICD-10-CM

## 2021-03-30 DIAGNOSIS — Z00.00 ANNUAL PHYSICAL EXAM: Primary | ICD-10-CM

## 2021-03-30 DIAGNOSIS — Z00.00 ANNUAL PHYSICAL EXAM: ICD-10-CM

## 2021-03-30 LAB
ALBUMIN SERPL-MCNC: 4.9 G/DL (ref 3.5–5.2)
ALP BLD-CCNC: 56 U/L (ref 35–104)
ALT SERPL-CCNC: 22 U/L (ref 0–32)
ANION GAP SERPL CALCULATED.3IONS-SCNC: 18 MMOL/L (ref 7–16)
AST SERPL-CCNC: 20 U/L (ref 0–31)
BASOPHILS ABSOLUTE: 0.04 E9/L (ref 0–0.2)
BASOPHILS RELATIVE PERCENT: 0.5 % (ref 0–2)
BILIRUB SERPL-MCNC: 0.6 MG/DL (ref 0–1.2)
BUN BLDV-MCNC: 10 MG/DL (ref 6–20)
CALCIUM SERPL-MCNC: 10.8 MG/DL (ref 8.6–10.2)
CHLORIDE BLD-SCNC: 99 MMOL/L (ref 98–107)
CHOLESTEROL, TOTAL: 187 MG/DL (ref 0–199)
CO2: 22 MMOL/L (ref 22–29)
CREAT SERPL-MCNC: 0.7 MG/DL (ref 0.5–1)
EOSINOPHILS ABSOLUTE: 0.05 E9/L (ref 0.05–0.5)
EOSINOPHILS RELATIVE PERCENT: 0.6 % (ref 0–6)
GFR AFRICAN AMERICAN: >60
GFR NON-AFRICAN AMERICAN: >60 ML/MIN/1.73
GLUCOSE BLD-MCNC: 89 MG/DL (ref 74–99)
HCT VFR BLD CALC: 42.7 % (ref 34–48)
HDLC SERPL-MCNC: 46 MG/DL
HEMOGLOBIN: 13.9 G/DL (ref 11.5–15.5)
IMMATURE GRANULOCYTES #: 0.04 E9/L
IMMATURE GRANULOCYTES %: 0.5 % (ref 0–5)
LDL CHOLESTEROL CALCULATED: 118 MG/DL (ref 0–99)
LYMPHOCYTES ABSOLUTE: 2.82 E9/L (ref 1.5–4)
LYMPHOCYTES RELATIVE PERCENT: 32.3 % (ref 20–42)
MCH RBC QN AUTO: 29.4 PG (ref 26–35)
MCHC RBC AUTO-ENTMCNC: 32.6 % (ref 32–34.5)
MCV RBC AUTO: 90.5 FL (ref 80–99.9)
MONOCYTES ABSOLUTE: 0.63 E9/L (ref 0.1–0.95)
MONOCYTES RELATIVE PERCENT: 7.2 % (ref 2–12)
NEUTROPHILS ABSOLUTE: 5.16 E9/L (ref 1.8–7.3)
NEUTROPHILS RELATIVE PERCENT: 58.9 % (ref 43–80)
PDW BLD-RTO: 12.5 FL (ref 11.5–15)
PLATELET # BLD: 316 E9/L (ref 130–450)
PMV BLD AUTO: 10.8 FL (ref 7–12)
POTASSIUM SERPL-SCNC: 4.1 MMOL/L (ref 3.5–5)
RBC # BLD: 4.72 E12/L (ref 3.5–5.5)
SODIUM BLD-SCNC: 139 MMOL/L (ref 132–146)
T4 TOTAL: 6.8 MCG/DL (ref 4.5–11.7)
TOTAL PROTEIN: 8 G/DL (ref 6.4–8.3)
TRIGL SERPL-MCNC: 116 MG/DL (ref 0–149)
TSH SERPL DL<=0.05 MIU/L-ACNC: 1.92 UIU/ML (ref 0.27–4.2)
VLDLC SERPL CALC-MCNC: 23 MG/DL
WBC # BLD: 8.7 E9/L (ref 4.5–11.5)

## 2021-03-30 PROCEDURE — G8484 FLU IMMUNIZE NO ADMIN: HCPCS | Performed by: FAMILY MEDICINE

## 2021-03-30 PROCEDURE — G8427 DOCREV CUR MEDS BY ELIG CLIN: HCPCS | Performed by: FAMILY MEDICINE

## 2021-03-30 PROCEDURE — G8417 CALC BMI ABV UP PARAM F/U: HCPCS | Performed by: FAMILY MEDICINE

## 2021-03-30 PROCEDURE — 1036F TOBACCO NON-USER: CPT | Performed by: FAMILY MEDICINE

## 2021-03-30 PROCEDURE — 99396 PREV VISIT EST AGE 40-64: CPT | Performed by: FAMILY MEDICINE

## 2021-03-30 ASSESSMENT — ENCOUNTER SYMPTOMS
RESPIRATORY NEGATIVE: 1
GASTROINTESTINAL NEGATIVE: 1
ALLERGIC/IMMUNOLOGIC NEGATIVE: 1
EYES NEGATIVE: 1

## 2021-03-30 NOTE — PROGRESS NOTES
3/30/21     [facility]    : 1980 Sex: female   Age: 35 y.o. Chief Complaint   Patient presents with    Referral - General     needs new GYN    Back Pain     had some issues in november        Prior to Admission medications    Not on File          HPI: Patient seen today health maintenance physical and menstrual irregularities. She admits to having an implant for birth control and had been following locally with Dr. Pablo Jordan. We are going to refer her to Dr. Kenneth Lau. Medically otherwise she has been stable. Baseline labs will be completed today. Plans for mammogram prior to follow-up. Review of Systems   Constitutional: Negative. HENT: Negative. Eyes: Negative. Respiratory: Negative. Gastrointestinal: Negative. Endocrine: Negative. Genitourinary: Negative. Musculoskeletal: Negative. Skin: Negative. Allergic/Immunologic: Negative. Neurological: Negative. Hematological: Negative. Psychiatric/Behavioral: Negative. No current outpatient medications on file. No Known Allergies    Social History     Tobacco Use    Smoking status: Former Smoker     Packs/day: 2.00     Years: 10.00     Pack years: 20.00     Quit date:      Years since quitting: 15.2    Smokeless tobacco: Never Used   Substance Use Topics    Alcohol use:  Yes     Alcohol/week: 3.0 standard drinks     Types: 3 Glasses of wine per week     Frequency: Never     Comment: occassional    Drug use: Never      Past Surgical History:   Procedure Laterality Date    DILATION AND CURETTAGE OF UTERUS  2010     Family History   Problem Relation Age of Onset    Hypertension Mother     No Known Problems Father     Cancer Paternal Aunt     Coronary Art Dis Maternal Grandmother      Past Medical History:   Diagnosis Date    Depression     Kidney stones        Vitals:    21 1613   BP: 124/80   Pulse: 89   Resp: 18   Temp: 97.3 °F (36.3 °C)   SpO2: 98%   Weight: 192 lb (87.1 kg)   Height: 5' 6\" (1.676 m)     BP Readings from Last 3 Encounters:   03/30/21 124/80   02/09/21 115/62   01/12/21 124/82    Body mass index is 30.99 kg/m². Physical Exam  Vitals signs and nursing note reviewed. Constitutional:       Appearance: She is well-developed. HENT:      Head: Normocephalic. Right Ear: External ear normal.      Left Ear: External ear normal.      Nose: Nose normal.   Eyes:      Conjunctiva/sclera: Conjunctivae normal.      Pupils: Pupils are equal, round, and reactive to light. Neck:      Musculoskeletal: Normal range of motion and neck supple. Cardiovascular:      Rate and Rhythm: Normal rate. Pulmonary:      Breath sounds: Normal breath sounds. Abdominal:      General: Bowel sounds are normal.      Palpations: Abdomen is soft. Musculoskeletal: Normal range of motion. Skin:     General: Skin is warm and dry. Neurological:      Mental Status: She is alert and oriented to person, place, and time. Psychiatric:         Behavior: Behavior normal.     Vitals physical exam all stable. Medically has been well. We will plan reassessment next month for review labs and then further discussion on possible weight loss management as well. BMI slightly over 30. Plan Per Assessment:  Rocio GONZALEZ was seen today for referral - general and back pain. Diagnoses and all orders for this visit:    Annual physical exam  -     Florence Bose MD, [facility]  -     CBC Auto Differential; Future  -     Comprehensive Metabolic Panel; Future  -     Lipid Panel; Future  -     T4; Future  -     TSH without Reflex; Future  -     JENNIFER DIGITAL SCREEN BILATERAL PER PROTOCOL; Future  -     CBC Auto Differential; Future  -     Comprehensive Metabolic Panel; Future  -     Lipid Panel; Future  -     T4; Future  -     TSH without Reflex; Future    Menses, irregular            Return in about 4 weeks (around 4/27/2021).       Tiffany Phan DO    Note was generated with the assistance of voice recognition software. Document was reviewed however may contain grammatical errors.

## 2021-04-22 DIAGNOSIS — Z12.31 VISIT FOR SCREENING MAMMOGRAM: Primary | ICD-10-CM

## 2021-04-23 ENCOUNTER — HOSPITAL ENCOUNTER (OUTPATIENT)
Dept: GENERAL RADIOLOGY | Age: 41
Discharge: HOME | End: 2021-04-25
Payer: COMMERCIAL

## 2021-04-23 DIAGNOSIS — Z12.31 VISIT FOR SCREENING MAMMOGRAM: ICD-10-CM

## 2021-04-23 PROCEDURE — 77063 BREAST TOMOSYNTHESIS BI: CPT

## 2021-04-26 ENCOUNTER — TELEPHONE (OUTPATIENT)
Dept: GENERAL RADIOLOGY | Age: 41
End: 2021-04-26

## 2021-04-26 DIAGNOSIS — R92.8 ABNORMAL MAMMOGRAM: Primary | ICD-10-CM

## 2021-04-26 NOTE — TELEPHONE ENCOUNTER
Call to patient in reference to her mammogram performed at [facility] on April 23, 2021. Instructed patient that the radiologist has recommended some additional breast imaging, in order to make a final determination/result. Verbalizes understanding and is agreeable to proceed.        Ty Jones RN, BSN, [address]

## 2021-04-28 ENCOUNTER — HOSPITAL ENCOUNTER (OUTPATIENT)
Dept: GENERAL RADIOLOGY | Age: 41
Discharge: HOME | End: 2021-04-30
Payer: COMMERCIAL

## 2021-04-28 DIAGNOSIS — R92.8 ABNORMAL MAMMOGRAM OF RIGHT BREAST: ICD-10-CM

## 2021-04-28 DIAGNOSIS — R92.8 ABNORMAL MAMMOGRAM: ICD-10-CM

## 2021-04-28 PROCEDURE — 77065 DX MAMMO INCL CAD UNI: CPT

## 2021-04-28 PROCEDURE — 76642 ULTRASOUND BREAST LIMITED: CPT

## 2021-04-29 PROBLEM — Z00.00 ANNUAL PHYSICAL EXAM: Status: RESOLVED | Noted: 2021-03-30 | Resolved: 2021-04-29

## 2021-05-03 ENCOUNTER — OFFICE VISIT (OUTPATIENT)
Dept: PRIMARY CARE CLINIC | Age: 41
End: 2021-05-03
Payer: COMMERCIAL

## 2021-05-03 VITALS
TEMPERATURE: 97.5 F | SYSTOLIC BLOOD PRESSURE: 124 MMHG | HEIGHT: 66 IN | BODY MASS INDEX: 30.86 KG/M2 | OXYGEN SATURATION: 97 % | WEIGHT: 192 LBS | HEART RATE: 90 BPM | DIASTOLIC BLOOD PRESSURE: 86 MMHG

## 2021-05-03 DIAGNOSIS — E83.52 HYPERCALCEMIA: Primary | ICD-10-CM

## 2021-05-03 DIAGNOSIS — R63.5 WEIGHT GAIN: ICD-10-CM

## 2021-05-03 PROCEDURE — 99214 OFFICE O/P EST MOD 30 MIN: CPT | Performed by: FAMILY MEDICINE

## 2021-05-03 PROCEDURE — G8417 CALC BMI ABV UP PARAM F/U: HCPCS | Performed by: FAMILY MEDICINE

## 2021-05-03 PROCEDURE — 1036F TOBACCO NON-USER: CPT | Performed by: FAMILY MEDICINE

## 2021-05-03 PROCEDURE — G8427 DOCREV CUR MEDS BY ELIG CLIN: HCPCS | Performed by: FAMILY MEDICINE

## 2021-05-03 RX ORDER — PHENTERMINE AND TOPIRAMATE 3.75; 23 MG/1; MG/1
CAPSULE, EXTENDED RELEASE ORAL
Qty: 14 CAPSULE | Refills: 0 | Status: SHIPPED | OUTPATIENT
Start: 2021-05-03 | End: 2021-05-17

## 2021-05-03 ASSESSMENT — PATIENT HEALTH QUESTIONNAIRE - PHQ9
1. LITTLE INTEREST OR PLEASURE IN DOING THINGS: 0
SUM OF ALL RESPONSES TO PHQ QUESTIONS 1-9: 1

## 2021-05-03 NOTE — PROGRESS NOTES
5/3/21     Gabriel Gates    : 1980 Sex: female   Age: 35 y.o. Chief Complaint   Patient presents with   [address]       Prior to Admission medications    Not on File          HPI: Patient is seen today medical follow-up review of labs. Mild hypercalcemia is present and we will repeat a CMP with next visit. Difficulties with weight gain and BMI above 30. Discussed treatment options and she was agreeable to trying phentermine topiramate which was initiated today. Instructions in use side effects of the medication and then follow-up with next visit. Review of Systems today systems review is stable. Cardiac no complaints. Respiratory no complaints. GI no complaints. Musculoskeletal no complaints. Primary concern was her weight gain and treatment options. No current outpatient medications on file. No Known Allergies    Social History     Tobacco Use    Smoking status: Former Smoker     Packs/day: 2.00     Years: 10.00     Pack years: 20.00     Quit date:      Years since quitting: 15.3    Smokeless tobacco: Never Used   Substance Use Topics    Alcohol use:  Yes     Alcohol/week: 3.0 standard drinks     Types: 3 Glasses of wine per week     Frequency: Never     Comment: occassional    Drug use: Never      Past Surgical History:   Procedure Laterality Date    DILATION AND CURETTAGE OF UTERUS       Family History   Problem Relation Age of Onset    Hypertension Mother     No Known Problems Father     Cancer Paternal Aunt     Breast Cancer Paternal Aunt 48    Coronary Art Dis Maternal Grandmother     Breast Cancer Paternal Cousin 36     Past Medical History:   Diagnosis Date    Depression     Kidney stones        Vitals:    21 1608   BP: 124/86   Pulse: 90   Temp: 97.5 °F (36.4 °C)   SpO2: 97%   Weight: 192 lb (87.1 kg)   Height: 5' 6\" (1.676 m)     BP Readings from Last 3 Encounters:   21 124/86   21 124/80   21 115/62        Physical Exam physical exam findings stable. Vitals are stable as noted. Heart regular rate and rhythm. Lungs are clear. Abdomen benign. Neurologically stable. Labs reviewed and currently stable. Slight hypercalcemia noted. Repeat blood work with next visit. Reassessment 1 month and sooner if problems. Plan Per Assessment:  There are no diagnoses linked to this encounter. No follow-ups on file. Bev Mcdonnell DO    Note was generated with the assistance of voice recognition software. Document was reviewed however may contain grammatical errors.

## 2021-05-03 NOTE — PROGRESS NOTES
5/3/21     Fariba Gates    : 1980 Sex: female   Age: 35 y.o. Chief Complaint   Patient presents with   [address]       Prior to Admission medications    Not on File          HPI:           Review of Systems         No current outpatient medications on file. No Known Allergies    Social History     Tobacco Use    Smoking status: Former Smoker     Packs/day: 2.00     Years: 10.00     Pack years: 20.00     Quit date:      Years since quitting: 15.3    Smokeless tobacco: Never Used   Substance Use Topics    Alcohol use: Yes     Alcohol/week: 3.0 standard drinks     Types: 3 Glasses of wine per week     Frequency: Never     Comment: occassional    Drug use: Never      Past Surgical History:   Procedure Laterality Date    DILATION AND CURETTAGE OF UTERUS  2010     Family History   Problem Relation Age of Onset    Hypertension Mother     No Known Problems Father     Cancer Paternal Aunt     Breast Cancer Paternal Aunt 48    Coronary Art Dis Maternal Grandmother     Breast Cancer Paternal Cousin 36     Past Medical History:   Diagnosis Date    Depression     Kidney stones        Vitals:    21 1608   BP: 124/86   Pulse: 90   Temp: 97.5 °F (36.4 °C)   SpO2: 97%   Weight: 192 lb (87.1 kg)   Height: 5' 6\" (1.676 m)     BP Readings from Last 3 Encounters:   21 124/86   21 124/80   21 115/62    Body mass index is 30.99 kg/m². Physical Exam             Plan Per Assessment:  There are no diagnoses linked to this encounter. No follow-ups on file. Opal Grace, DO    Note was generated with the assistance of voice recognition software. Document was reviewed however may contain grammatical errors.

## 2021-06-07 NOTE — TELEPHONE ENCOUNTER
You prescribed the pt Phentermine-Topiramate 7.5-46 mg but her insurance will not cover it.  She called them to talk to them about it and they gave her 3 preferred medications to try instead, so she is calling to see if one of them can be sent over to the pharmacy   Phentermine HCL   Benzphetamine HCL  Diethylpropion HCL

## 2021-06-10 ENCOUNTER — OFFICE VISIT (OUTPATIENT)
Dept: PRIMARY CARE CLINIC | Age: 41
End: 2021-06-10
Payer: COMMERCIAL

## 2021-06-10 VITALS
SYSTOLIC BLOOD PRESSURE: 120 MMHG | BODY MASS INDEX: 30.51 KG/M2 | DIASTOLIC BLOOD PRESSURE: 82 MMHG | HEART RATE: 80 BPM | TEMPERATURE: 97.6 F | WEIGHT: 189 LBS | OXYGEN SATURATION: 99 %

## 2021-06-10 DIAGNOSIS — E78.5 DYSLIPIDEMIA: ICD-10-CM

## 2021-06-10 DIAGNOSIS — E83.52 HYPERCALCEMIA: ICD-10-CM

## 2021-06-10 DIAGNOSIS — R63.5 WEIGHT GAIN: Primary | ICD-10-CM

## 2021-06-10 PROCEDURE — G8417 CALC BMI ABV UP PARAM F/U: HCPCS | Performed by: FAMILY MEDICINE

## 2021-06-10 PROCEDURE — G8427 DOCREV CUR MEDS BY ELIG CLIN: HCPCS | Performed by: FAMILY MEDICINE

## 2021-06-10 PROCEDURE — 99214 OFFICE O/P EST MOD 30 MIN: CPT | Performed by: FAMILY MEDICINE

## 2021-06-10 PROCEDURE — 1036F TOBACCO NON-USER: CPT | Performed by: FAMILY MEDICINE

## 2021-06-10 RX ORDER — PHENTERMINE HYDROCHLORIDE 37.5 MG/1
37.5 CAPSULE ORAL EVERY MORNING
Qty: 30 CAPSULE | Refills: 0 | Status: SHIPPED | OUTPATIENT
Start: 2021-06-10 | End: 2021-07-08 | Stop reason: SDUPTHER

## 2021-06-10 ASSESSMENT — ENCOUNTER SYMPTOMS
RESPIRATORY NEGATIVE: 1
ALLERGIC/IMMUNOLOGIC NEGATIVE: 1
GASTROINTESTINAL NEGATIVE: 1
EYES NEGATIVE: 1

## 2021-06-10 NOTE — PROGRESS NOTES
6/10/21     Doc Gates    : 1980 Sex: female   Age: 35 y.o. Chief Complaint   Patient presents with    Weight Loss     would like to discuss alternative options       Prior to Admission medications    Not on File          HPI:           Review of Systems         No current outpatient medications on file. No Known Allergies    Social History     Tobacco Use    Smoking status: Former Smoker     Packs/day: 2.00     Years: 10.00     Pack years: 20.00     Quit date:      Years since quitting: 15.4    Smokeless tobacco: Never Used   Vaping Use    Vaping Use: Never used   Substance Use Topics    Alcohol use: Yes     Alcohol/week: 3.0 standard drinks     Types: 3 Glasses of wine per week     Comment: occassional    Drug use: Never      Past Surgical History:   Procedure Laterality Date    DILATION AND CURETTAGE OF UTERUS  2010     Family History   Problem Relation Age of Onset    Hypertension Mother     No Known Problems Father     Cancer Paternal Aunt     Breast Cancer Paternal Aunt 48    Coronary Art Dis Maternal Grandmother     Breast Cancer Paternal Cousin 36     Past Medical History:   Diagnosis Date    Depression     Kidney stones        Vitals:    06/10/21 1622   BP: 120/82   Pulse: 80   Temp: 97.6 °F (36.4 °C)   SpO2: 99%   Weight: 189 lb (85.7 kg)     BP Readings from Last 3 Encounters:   06/10/21 120/82   21 124/86   21 124/80      Body mass index is 30.51 kg/m². Physical Exam             Plan Per Assessment:  There are no diagnoses linked to this encounter. No follow-ups on file. Radha Pass, DO    Note was generated with the assistance of voice recognition software. Document was reviewed however may contain grammatical errors.

## 2021-06-10 NOTE — PROGRESS NOTES
6/10/21     David Gates    : 1980 Sex: female   Age: 35 y.o. Chief Complaint   Patient presents with    Weight Loss     would like to discuss alternative options       Prior to Admission medications    Medication Sig Start Date End Date Taking? Authorizing Provider   phentermine (ADIPEX-P) 37.5 MG capsule Take 1 capsule by mouth every morning for 30 days. 6/10/21 7/10/21 Yes Cheri Chavarria DO          HPI: Alayna Gavin presents today problems with weight gain dyslipidemia hypercalcemia. We have discussed Qsymia but she is not a candidate because of cost.  Offered Adipex 37.5 mg daily with instructions in use side effects. BMI is greater than 30. She is a candidate for medication and understands maximum usage of 90 days. Encouraging diet and exercise and treatment as noted. Notify me if trouble with medicine. Lab studies again on follow-up. Review of Systems   Constitutional: Negative. HENT: Negative. Eyes: Negative. Respiratory: Negative. Gastrointestinal: Negative. Endocrine: Negative. Genitourinary: Negative. Musculoskeletal: Negative. Skin: Negative. Allergic/Immunologic: Negative. Neurological: Negative. Hematological: Negative. Psychiatric/Behavioral: Negative. Today systems review stable. Current Outpatient Medications:     phentermine (ADIPEX-P) 37.5 MG capsule, Take 1 capsule by mouth every morning for 30 days. , Disp: 30 capsule, Rfl: 0    No Known Allergies    Social History     Tobacco Use    Smoking status: Former Smoker     Packs/day: 2.00     Years: 10.00     Pack years: 20.00     Quit date:      Years since quitting: 15.4    Smokeless tobacco: Never Used   Vaping Use    Vaping Use: Never used   Substance Use Topics    Alcohol use:  Yes     Alcohol/week: 3.0 standard drinks     Types: 3 Glasses of wine per week     Comment: occassional    Drug use: Never      Past Surgical History:   Procedure Laterality Date    follow-ups on file. Bev Mcdonnell DO    Note was generated with the assistance of voice recognition software. Document was reviewed however may contain grammatical errors.

## 2021-07-08 ENCOUNTER — OFFICE VISIT (OUTPATIENT)
Dept: PRIMARY CARE CLINIC | Age: 41
End: 2021-07-08
Payer: COMMERCIAL

## 2021-07-08 VITALS
SYSTOLIC BLOOD PRESSURE: 120 MMHG | DIASTOLIC BLOOD PRESSURE: 82 MMHG | TEMPERATURE: 97.4 F | WEIGHT: 181 LBS | OXYGEN SATURATION: 98 % | HEART RATE: 81 BPM | BODY MASS INDEX: 29.21 KG/M2

## 2021-07-08 DIAGNOSIS — E78.5 DYSLIPIDEMIA: ICD-10-CM

## 2021-07-08 DIAGNOSIS — R63.5 WEIGHT GAIN: ICD-10-CM

## 2021-07-08 PROCEDURE — 1036F TOBACCO NON-USER: CPT | Performed by: FAMILY MEDICINE

## 2021-07-08 PROCEDURE — G8427 DOCREV CUR MEDS BY ELIG CLIN: HCPCS | Performed by: FAMILY MEDICINE

## 2021-07-08 PROCEDURE — G8417 CALC BMI ABV UP PARAM F/U: HCPCS | Performed by: FAMILY MEDICINE

## 2021-07-08 PROCEDURE — 99213 OFFICE O/P EST LOW 20 MIN: CPT | Performed by: FAMILY MEDICINE

## 2021-07-08 RX ORDER — PHENTERMINE HYDROCHLORIDE 37.5 MG/1
37.5 CAPSULE ORAL EVERY MORNING
Qty: 30 CAPSULE | Refills: 0 | Status: SHIPPED | OUTPATIENT
Start: 2021-07-08 | End: 2021-08-07

## 2021-07-08 ASSESSMENT — ENCOUNTER SYMPTOMS
RESPIRATORY NEGATIVE: 1
ALLERGIC/IMMUNOLOGIC NEGATIVE: 1
EYES NEGATIVE: 1
GASTROINTESTINAL NEGATIVE: 1

## 2021-07-08 NOTE — PROGRESS NOTES
21     Yesenia Gates    : 1980 Sex: female   Age: 35 y.o. Chief Complaint   Patient presents with    Weight Loss       Prior to Admission medications    Medication Sig Start Date End Date Taking? Authorizing Provider   phentermine (ADIPEX-P) 37.5 MG capsule Take 1 capsule by mouth every morning for 30 days. 21 Yes Latriec Nava DO          HPI:           Review of Systems           Current Outpatient Medications:     phentermine (ADIPEX-P) 37.5 MG capsule, Take 1 capsule by mouth every morning for 30 days. , Disp: 30 capsule, Rfl: 0    No Known Allergies    Social History     Tobacco Use    Smoking status: Former Smoker     Packs/day: 2.00     Years: 10.00     Pack years: 20.00     Quit date:      Years since quitting: 15.5    Smokeless tobacco: Never Used   Vaping Use    Vaping Use: Never used   Substance Use Topics    Alcohol use: Yes     Alcohol/week: 3.0 standard drinks     Types: 3 Glasses of wine per week     Comment: occassional    Drug use: Never      Past Surgical History:   Procedure Laterality Date    DILATION AND CURETTAGE OF UTERUS  2010     Family History   Problem Relation Age of Onset    Hypertension Mother     No Known Problems Father     Cancer Paternal Aunt     Breast Cancer Paternal Aunt 48    Coronary Art Dis Maternal Grandmother     Breast Cancer Paternal Cousin 36     Past Medical History:   Diagnosis Date    Depression     Kidney stones        Vitals:    21 1639   BP: 120/82   Pulse: 81   Temp: 97.4 °F (36.3 °C)   SpO2: 98%   Weight: 181 lb (82.1 kg)     BP Readings from Last 3 Encounters:   21 120/82   06/10/21 120/82   21 124/86    120/82    Physical Exam             Plan Per Assessment:  Rocio GONZALEZ was seen today for weight loss. Diagnoses and all orders for this visit:    Weight gain  -     phentermine (ADIPEX-P) 37.5 MG capsule; Take 1 capsule by mouth every morning for 30 days.     Dyslipidemia  -     phentermine (ADIPEX-P) 37.5 MG capsule; Take 1 capsule by mouth every morning for 30 days. No follow-ups on file. Curlie Link, DO    Note was generated with the assistance of voice recognition software. Document was reviewed however may contain grammatical errors.

## 2021-07-08 NOTE — PROGRESS NOTES
21     Lilibeth Gates    : 1980 Sex: female   Age: 35 y.o. Chief Complaint   Patient presents with    Weight Loss       Prior to Admission medications    Medication Sig Start Date End Date Taking? Authorizing Provider   phentermine (ADIPEX-P) 37.5 MG capsule Take 1 capsule by mouth every morning for 30 days. 21 Yes Bob Lan DO          HPI: Patient evaluated today weight gain problems dyslipidemia. Medically overall is been stable and doing very well with present medication. No blood pressure abnormalities noted. No problems with tachycardia. Review of Systems   Constitutional: Negative. HENT: Negative. Eyes: Negative. Respiratory: Negative. Gastrointestinal: Negative. Endocrine: Negative. Genitourinary: Negative. Musculoskeletal: Negative. Skin: Negative. Allergic/Immunologic: Negative. Neurological: Negative. Hematological: Negative. Psychiatric/Behavioral: Negative. Today systems review stable we will continue meds as prescribed. Current Outpatient Medications:     phentermine (ADIPEX-P) 37.5 MG capsule, Take 1 capsule by mouth every morning for 30 days. , Disp: 30 capsule, Rfl: 0    No Known Allergies    Social History     Tobacco Use    Smoking status: Former Smoker     Packs/day: 2.00     Years: 10.00     Pack years: 20.00     Quit date:      Years since quitting: 15.5    Smokeless tobacco: Never Used   Vaping Use    Vaping Use: Never used   Substance Use Topics    Alcohol use:  Yes     Alcohol/week: 3.0 standard drinks     Types: 3 Glasses of wine per week     Comment: occassional    Drug use: Never      Past Surgical History:   Procedure Laterality Date    DILATION AND CURETTAGE OF UTERUS  2010     Family History   Problem Relation Age of Onset    Hypertension Mother     No Known Problems Father     Cancer Paternal Aunt     Breast Cancer Paternal Aunt 48    Coronary Art Dis Maternal Grandmother     Breast Cancer Paternal Cousin 36     Past Medical History:   Diagnosis Date    Depression     Kidney stones        Vitals:    07/08/21 1639   BP: 120/82   Pulse: 81   Temp: 97.4 °F (36.3 °C)   SpO2: 98%   Weight: 181 lb (82.1 kg)     BP Readings from Last 3 Encounters:   07/08/21 120/82   06/10/21 120/82   05/03/21 124/86        Physical Exam  Vitals and nursing note reviewed. Constitutional:       Appearance: She is well-developed. HENT:      Head: Normocephalic. Right Ear: External ear normal.      Left Ear: External ear normal.      Nose: Nose normal.   Eyes:      Conjunctiva/sclera: Conjunctivae normal.      Pupils: Pupils are equal, round, and reactive to light. Cardiovascular:      Rate and Rhythm: Normal rate. Pulmonary:      Breath sounds: Normal breath sounds. Abdominal:      General: Bowel sounds are normal.      Palpations: Abdomen is soft. Musculoskeletal:         General: Normal range of motion. Cervical back: Normal range of motion and neck supple. Skin:     General: Skin is warm and dry. Neurological:      Mental Status: She is alert and oriented to person, place, and time. Psychiatric:         Behavior: Behavior normal.     Present vitals physical examination stable. I will maintain present meds and care. Follow-up visit 1 month and sooner if problems. Plan Per Assessment:  Rocio GONZALEZ was seen today for weight loss. Diagnoses and all orders for this visit:    Weight gain  -     phentermine (ADIPEX-P) 37.5 MG capsule; Take 1 capsule by mouth every morning for 30 days. Dyslipidemia  -     phentermine (ADIPEX-P) 37.5 MG capsule; Take 1 capsule by mouth every morning for 30 days. No follow-ups on file. Mary Byrd DO    Note was generated with the assistance of voice recognition software. Document was reviewed however may contain grammatical errors.

## 2021-08-09 ENCOUNTER — OFFICE VISIT (OUTPATIENT)
Dept: PRIMARY CARE CLINIC | Age: 41
End: 2021-08-09
Payer: COMMERCIAL

## 2021-08-09 VITALS
OXYGEN SATURATION: 99 % | TEMPERATURE: 97.2 F | HEART RATE: 98 BPM | HEIGHT: 66 IN | SYSTOLIC BLOOD PRESSURE: 114 MMHG | DIASTOLIC BLOOD PRESSURE: 80 MMHG | BODY MASS INDEX: 29.25 KG/M2 | WEIGHT: 182 LBS

## 2021-08-09 DIAGNOSIS — R63.5 WEIGHT GAIN: Primary | ICD-10-CM

## 2021-08-09 PROCEDURE — 99213 OFFICE O/P EST LOW 20 MIN: CPT | Performed by: FAMILY MEDICINE

## 2021-08-09 PROCEDURE — G8417 CALC BMI ABV UP PARAM F/U: HCPCS | Performed by: FAMILY MEDICINE

## 2021-08-09 PROCEDURE — 1036F TOBACCO NON-USER: CPT | Performed by: FAMILY MEDICINE

## 2021-08-09 PROCEDURE — G8427 DOCREV CUR MEDS BY ELIG CLIN: HCPCS | Performed by: FAMILY MEDICINE

## 2021-08-09 RX ORDER — PHENTERMINE HYDROCHLORIDE 37.5 MG/1
37.5 TABLET ORAL
Qty: 30 TABLET | Refills: 0 | Status: SHIPPED | OUTPATIENT
Start: 2021-08-09 | End: 2021-09-08

## 2021-08-09 NOTE — PROGRESS NOTES
21     Chente Gates    : 1980 Sex: female   Age: 35 y.o. Chief Complaint   Patient presents with    Weight Loss       Prior to Admission medications    Medication Sig Start Date End Date Taking? Authorizing Provider   phentermine (ADIPEX-P) 37.5 MG tablet Take 1 tablet by mouth every morning (before breakfast) for 30 days. 21 Yes Ugo Santos DO          HPI: Patient evaluated today problems with weight management. She has been using Adipex with some success down approximately 10 pounds since initiating medication back in . Today will be her third and final month of medication. Reassess in 30 days. Continue encourage diet exercise program as well. Medically otherwise has been stable. Review of Systems   Constitutional: Negative. HENT: Negative. Eyes: Negative. Respiratory: Negative. Gastrointestinal: Negative. Endocrine: Negative. Genitourinary: Negative. Musculoskeletal: Negative. Skin: Negative. Allergic/Immunologic: Negative. Neurological: Negative. Hematological: Negative. Psychiatric/Behavioral: Negative. Current Outpatient Medications:     phentermine (ADIPEX-P) 37.5 MG tablet, Take 1 tablet by mouth every morning (before breakfast) for 30 days. , Disp: 30 tablet, Rfl: 0    No Known Allergies    Social History     Tobacco Use    Smoking status: Former Smoker     Packs/day: 2.00     Years: 10.00     Pack years: 20.00     Quit date:      Years since quitting: 15.6    Smokeless tobacco: Never Used   Vaping Use    Vaping Use: Never used   Substance Use Topics    Alcohol use:  Yes     Alcohol/week: 3.0 standard drinks     Types: 3 Glasses of wine per week     Comment: occassional    Drug use: Never      Past Surgical History:   Procedure Laterality Date    DILATION AND CURETTAGE OF UTERUS  2010     Family History   Problem Relation Age of Onset    Hypertension Mother     No Known Problems Father    Christie Syed Cancer Paternal Aunt     Breast Cancer Paternal Aunt 48    Coronary Art Dis Maternal Grandmother     Breast Cancer Paternal Cousin 36     Past Medical History:   Diagnosis Date    Depression     Kidney stones        Vitals:    08/09/21 1443   BP: 114/80   Pulse: 98   Temp: 97.2 °F (36.2 °C)   SpO2: 99%   Weight: 182 lb (82.6 kg)   Height: 5' 6\" (1.676 m)     BP Readings from Last 3 Encounters:   08/09/21 114/80   07/08/21 120/82   06/10/21 120/82      Body mass index is 29.38 kg/m². Physical Exam  Vitals and nursing note reviewed. Constitutional:       Appearance: She is well-developed. HENT:      Head: Normocephalic. Right Ear: External ear normal.      Left Ear: External ear normal.      Nose: Nose normal.   Eyes:      Conjunctiva/sclera: Conjunctivae normal.      Pupils: Pupils are equal, round, and reactive to light. Cardiovascular:      Rate and Rhythm: Normal rate. Pulmonary:      Breath sounds: Normal breath sounds. Abdominal:      General: Bowel sounds are normal.      Palpations: Abdomen is soft. Musculoskeletal:         General: Normal range of motion. Cervical back: Normal range of motion and neck supple. Skin:     General: Skin is warm and dry. Neurological:      Mental Status: She is alert and oriented to person, place, and time. Psychiatric:         Behavior: Behavior normal.        Present vitals physical examination stable. Medications as prescribed. Continue meds as noted. Follow-up with me in 1 month and sooner if problems. Plan Per Assessment:  Rocio GONZALEZ was seen today for weight loss. Diagnoses and all orders for this visit:    Weight gain  -     phentermine (ADIPEX-P) 37.5 MG tablet; Take 1 tablet by mouth every morning (before breakfast) for 30 days. No follow-ups on file. Viviana Atkins DO    Note was generated with the assistance of voice recognition software. Document was reviewed however may contain grammatical errors.

## 2021-08-31 ENCOUNTER — TELEPHONE (OUTPATIENT)
Dept: PRIMARY CARE CLINIC | Age: 41
End: 2021-08-31

## 2021-08-31 NOTE — TELEPHONE ENCOUNTER
----- Message from Ellie Stover sent at 8/31/2021  2:32 PM EDT -----  Subject: Message to Provider    QUESTIONS  Information for Provider? Pt has a possible sinus infection and had a   covid test done and is waiting for results. Pt would like to have an   antibiotic prescribed for the sinus infection.  ---------------------------------------------------------------------------  --------------  CALL BACK INFO  What is the best way for the office to contact you? OK to leave message on   voicemail  Preferred Call Back Phone Number? [phone]  ---------------------------------------------------------------------------  --------------  SCRIPT ANSWERS  Relationship to Patient?  Self

## 2021-08-31 NOTE — TELEPHONE ENCOUNTER
Pt has a possible sinus infection and had a   covid test done and is waiting for results. Pt would like to have an   antibiotic prescribed for the sinus infection.

## 2021-12-06 ENCOUNTER — TELEPHONE (OUTPATIENT)
Dept: PRIMARY CARE CLINIC | Age: 41
End: 2021-12-06

## 2023-02-21 ENCOUNTER — OFFICE VISIT (OUTPATIENT)
Dept: FAMILY MEDICINE CLINIC | Age: 43
End: 2023-02-21
Payer: COMMERCIAL

## 2023-02-21 VITALS
OXYGEN SATURATION: 98 % | RESPIRATION RATE: 16 BRPM | HEIGHT: 66 IN | SYSTOLIC BLOOD PRESSURE: 136 MMHG | DIASTOLIC BLOOD PRESSURE: 84 MMHG | TEMPERATURE: 97.7 F | BODY MASS INDEX: 30.7 KG/M2 | WEIGHT: 191 LBS | HEART RATE: 88 BPM

## 2023-02-21 DIAGNOSIS — Z12.31 SCREENING MAMMOGRAM FOR BREAST CANCER: ICD-10-CM

## 2023-02-21 DIAGNOSIS — Z00.00 ANNUAL PHYSICAL EXAM: Primary | ICD-10-CM

## 2023-02-21 PROCEDURE — G8484 FLU IMMUNIZE NO ADMIN: HCPCS | Performed by: FAMILY MEDICINE

## 2023-02-21 PROCEDURE — 99396 PREV VISIT EST AGE 40-64: CPT | Performed by: FAMILY MEDICINE

## 2023-02-21 SDOH — ECONOMIC STABILITY: INCOME INSECURITY: HOW HARD IS IT FOR YOU TO PAY FOR THE VERY BASICS LIKE FOOD, HOUSING, MEDICAL CARE, AND HEATING?: NOT HARD AT ALL

## 2023-02-21 SDOH — ECONOMIC STABILITY: FOOD INSECURITY: WITHIN THE PAST 12 MONTHS, YOU WORRIED THAT YOUR FOOD WOULD RUN OUT BEFORE YOU GOT MONEY TO BUY MORE.: NEVER TRUE

## 2023-02-21 SDOH — ECONOMIC STABILITY: FOOD INSECURITY: WITHIN THE PAST 12 MONTHS, THE FOOD YOU BOUGHT JUST DIDN'T LAST AND YOU DIDN'T HAVE MONEY TO GET MORE.: NEVER TRUE

## 2023-02-21 SDOH — ECONOMIC STABILITY: HOUSING INSECURITY
IN THE LAST 12 MONTHS, WAS THERE A TIME WHEN YOU DID NOT HAVE A STEADY PLACE TO SLEEP OR SLEPT IN A SHELTER (INCLUDING NOW)?: NO

## 2023-02-21 ASSESSMENT — LIFESTYLE VARIABLES
HOW MANY STANDARD DRINKS CONTAINING ALCOHOL DO YOU HAVE ON A TYPICAL DAY: 3 OR 4
HOW OFTEN DO YOU HAVE A DRINK CONTAINING ALCOHOL: 2-4 TIMES A MONTH

## 2023-02-21 ASSESSMENT — PATIENT HEALTH QUESTIONNAIRE - PHQ9
SUM OF ALL RESPONSES TO PHQ QUESTIONS 1-9: 0
SUM OF ALL RESPONSES TO PHQ9 QUESTIONS 1 & 2: 0
2. FEELING DOWN, DEPRESSED OR HOPELESS: 0
SUM OF ALL RESPONSES TO PHQ QUESTIONS 1-9: 0
1. LITTLE INTEREST OR PLEASURE IN DOING THINGS: 0

## 2023-02-21 NOTE — PATIENT INSTRUCTIONS
Schedule with Gynecologist:    Dr. Umberto Null  [address] [facility]  [phone]    Dr. Stacia Cade [address]  [phone]    Dr. Karan Gonzalez  [address] [facility]  [phone]    [facility]   [facility], [address] [place] [phone]     Dr. Arun Terry  [address], [address] [place] [address]

## 2023-02-21 NOTE — PROGRESS NOTES
2/21/2023    Chief Complaint   Patient presents with    New Patient       Screening Questions:   Exercise 30 minutes daily 5 times weekly  Mammogram every 1-2 years age 36, then annually after age 48:  due  Pap smear UTD:  Yes   Fecal occult blood yearly after age  50/Colonoscopy:  due  Eye exam every 2-4 years, yearly if diabetic:  due   Dental exam:  Yes   BMI: Body mass index is 30.83 kg/m². Raman Amador Counseled on weight loss        Labs:  No results found for: EAG  Lab Results   Component Value Date    LDLCALC 118 (H) 03/30/2021      CBC:   Lab Results   Component Value Date/Time    WBC 8.7 03/30/2021 04:41 PM    RBC 4.72 03/30/2021 04:41 PM    HGB 13.9 03/30/2021 04:41 PM    HCT 42.7 03/30/2021 04:41 PM    MCV 90.5 03/30/2021 04:41 PM    MCH 29.4 03/30/2021 04:41 PM    MCHC 32.6 03/30/2021 04:41 PM    RDW 12.5 03/30/2021 04:41 PM     03/30/2021 04:41 PM    MPV 10.8 03/30/2021 04:41 PM         /84   Pulse 88   Temp 97.7 °F (36.5 °C)   Resp 16   Ht 5' 6\" (1.676 m)   Wt 191 lb (86.6 kg)   SpO2 98%   BMI 30.83 kg/m²   Wt Readings from Last 3 Encounters:   02/21/23 191 lb (86.6 kg)   08/09/21 182 lb (82.6 kg)   07/08/21 181 lb (82.1 kg)     Temp Readings from Last 3 Encounters:   02/21/23 97.7 °F (36.5 °C)   08/09/21 97.2 °F (36.2 °C)   07/08/21 97.4 °F (36.3 °C)     BP Readings from Last 3 Encounters:   02/21/23 136/84   08/09/21 114/80   07/08/21 120/82     Pulse Readings from Last 3 Encounters:   02/21/23 88   08/09/21 98   07/08/21 81     Patient's past medical, surgical, social and/or family history reviewed, updated in chart, and are non-contributory (unless otherwise stated). Medications and allergies also reviewed and updated in chart.     ROS  Review of Systems - General ROS: negative for - chills, fatigue, fever, night sweats, sleep disturbance, weight gain or weight loss  Psychological ROS: negative for - anxiety, behavioral disorder, depression, hallucinations, irritability, memory difficulties, mood swings, sleep disturbances or suicidal ideation  ENT ROS: negative for - epistaxis, headaches, hearing change, nasal congestion, nasal discharge, nasal polyps, sinus pain, tinnitus, vertigo or visual changes  Hematological and Lymphatic ROS: negative for - bleeding problems, blood clots, fatigue or swollen lymph nodes  Respiratory ROS: negative for - cough, orthopnea, shortness of breath, sputum changes, tachypnea or wheezing  Cardiovascular ROS: negative for - chest pain, dyspnea on exertion, irregular heartbeat, loss of consciousness, palpitations, paroxysmal nocturnal dyspnea or rapid heart rate  Gastrointestinal ROS: negative for - abdominal pain, blood in stools, change in bowel habits, constipation, diarrhea, gas/bloating, heartburn or nausea/vomiting  Musculoskeletal ROS: negative for - joint pain, joint stiffness, joint swelling or muscle, back pain, bowel or bladder incontinence  Neurological ROS: negative for - behavioral changes, confusion, dizziness, headaches, memory loss, numbness/tingling, seizures or speech problems, weakness  Dermatological ROS: negative for - dry skin, mole changes, nail changes, pruritus, rash or skin lesion changes    Patient's past medical, surgical, social and/or family history reviewed, updated in chart, and are non-contributory (unless otherwise stated). Medications and allergies also reviewed and updated in chart. Physical Exam:    General appearance: alert, well appearing, and in no distress, oriented to person, place, and time and normal appearing weight. CVS exam: normal rate, regular rhythm, normal S1, S2, no murmurs, rubs, clicks or gallops. Radial pulses 2+ bilateral.  PT/DP pulse 2+ bilat. No C/C/E    Chest: clear to auscultation, no wheezes, rales or rhonchi, symmetric air entry. Musculoskeletal: MS 5/5, DTR 2/4 x4 extremities, FROM F/E spine, no tenderness, obvious masses or deformities.  Gait normal.     Abdomen: Soft, non-tender, non-distended, positive BS in all 4 quadrants    Extremities:Dorsalis pedis pulses palpated bilaterally, no clubbing, cyanosis, edema or erythema     SKIN: no lesions, jaundice, petechiae, pallor, cyanosis, ecchymosis    NEURO: gross motor exam normal by observation, gait normal      Assessment/Plan  Rocio GONZALEZ was seen today for new patient. Diagnoses and all orders for this visit:    Annual physical exam  -     CBC; Future  -     Comprehensive Metabolic Panel; Future  -     Lipid Panel; Future  -     TSH; Future    Screening mammogram for breast cancer  -     JENNIFER DIGITAL SCREEN BILATERAL PER PROTOCOL; Future        Return in about 1 year (around 2/21/2024), or if symptoms worsen or fail to improve, for [address] [place] [address]. Graciela Hart, DO    Call or go to ED immediately if symptoms worsen or persist.    Educational materials and/or home exercises printed for patient's review and were included in patient instructions on his/her After Visit Summary and given to patient at the end of visit. Counseled regarding above diagnosis, including possible risks and complications,  especially if left uncontrolled. Counseled regarding the possible side effects, risks, benefits and alternatives to treatment; patient and/or guardian verbalizes understanding, agrees, feels comfortable with and wishes to proceed with above treatment plan. Advised patient to call with any new medication issues, and read all Rx info from pharmacy to assure aware of all possible risks and side effects of medication before taking. Reviewed age and gender appropriate health screening exams and vaccinations. Advised patient regarding importance of keeping up with recommended health maintenance and to schedule as soon as possible if overdue, as this is important in assessing for undiagnosed pathology, especially cancer, as well as protecting against potentially harmful/life threatening disease.       Patient and/or guardian verbalizes understanding and agrees with above counseling, assessment and plan. All questions answered.

## 2023-10-02 ENCOUNTER — HOSPITAL ENCOUNTER (OUTPATIENT)
Dept: GENERAL RADIOLOGY | Age: 43
Discharge: HOME | End: 2023-10-04
Attending: FAMILY MEDICINE
Payer: COMMERCIAL

## 2023-10-02 VITALS — HEIGHT: 66 IN | WEIGHT: 191 LBS | BODY MASS INDEX: 30.7 KG/M2

## 2023-10-02 DIAGNOSIS — Z12.31 SCREENING MAMMOGRAM FOR BREAST CANCER: ICD-10-CM

## 2023-10-02 PROCEDURE — 77063 BREAST TOMOSYNTHESIS BI: CPT

## 2023-10-03 DIAGNOSIS — R92.8 ABNORMAL MAMMOGRAM: Primary | ICD-10-CM

## 2023-10-05 ENCOUNTER — TELEPHONE (OUTPATIENT)
Dept: GENERAL RADIOLOGY | Age: 43
End: 2023-10-05

## 2023-10-05 NOTE — TELEPHONE ENCOUNTER
Call to patient in reference to her mammogram performed at [facility] on October 2, 2023. Instructed patient that the radiologist has recommended some additional breast imaging in order to make a final determination/result. Informed her that a Rx has been obtained by the ordering physician. Next, a  from [facility] will contact her to schedule the additional imaging study/studies. Verbalizes understanding and is agreeable to proceed.

## 2023-10-23 ENCOUNTER — HOSPITAL ENCOUNTER (OUTPATIENT)
Dept: GENERAL RADIOLOGY | Age: 43
Discharge: HOME | End: 2023-10-25
Attending: FAMILY MEDICINE
Payer: COMMERCIAL

## 2023-10-23 VITALS — BODY MASS INDEX: 30.7 KG/M2 | WEIGHT: 191 LBS | HEIGHT: 66 IN

## 2023-10-23 DIAGNOSIS — R92.8 ABNORMAL MAMMOGRAM: ICD-10-CM

## 2023-10-23 PROCEDURE — 77065 DX MAMMO INCL CAD UNI: CPT

## 2023-10-23 PROCEDURE — G0279 TOMOSYNTHESIS, MAMMO: HCPCS

## 2023-11-07 ENCOUNTER — TELEPHONE (OUTPATIENT)
Dept: FAMILY MEDICINE CLINIC | Age: 43
End: 2023-11-07

## 2023-11-07 NOTE — TELEPHONE ENCOUNTER
Patient just called in and tested positive for covid at home, she is having all the signs and symptoms of the flu, is there anything at all that you could call in for her?  Please advise    [address]

## 2024-01-07 ASSESSMENT — PATIENT HEALTH QUESTIONNAIRE - PHQ9
SUM OF ALL RESPONSES TO PHQ QUESTIONS 1-9: 2
1. LITTLE INTEREST OR PLEASURE IN DOING THINGS: 1
SUM OF ALL RESPONSES TO PHQ QUESTIONS 1-9: 2
SUM OF ALL RESPONSES TO PHQ9 QUESTIONS 1 & 2: 2
SUM OF ALL RESPONSES TO PHQ QUESTIONS 1-9: 2
SUM OF ALL RESPONSES TO PHQ QUESTIONS 1-9: 2
2. FEELING DOWN, DEPRESSED OR HOPELESS: 1

## 2024-01-08 ASSESSMENT — PATIENT HEALTH QUESTIONNAIRE - PHQ9
1. LITTLE INTEREST OR PLEASURE IN DOING THINGS: SEVERAL DAYS
SUM OF ALL RESPONSES TO PHQ9 QUESTIONS 1 & 2: 2
2. FEELING DOWN, DEPRESSED OR HOPELESS: SEVERAL DAYS

## 2024-02-06 ENCOUNTER — OFFICE VISIT (OUTPATIENT)
Dept: FAMILY MEDICINE CLINIC | Age: 44
End: 2024-02-06
Payer: COMMERCIAL

## 2024-02-06 ENCOUNTER — TELEPHONE (OUTPATIENT)
Dept: FAMILY MEDICINE CLINIC | Age: 44
End: 2024-02-06

## 2024-02-06 VITALS
HEART RATE: 74 BPM | BODY MASS INDEX: 31.43 KG/M2 | RESPIRATION RATE: 16 BRPM | DIASTOLIC BLOOD PRESSURE: 74 MMHG | HEIGHT: 66 IN | OXYGEN SATURATION: 99 % | SYSTOLIC BLOOD PRESSURE: 122 MMHG | WEIGHT: 195.6 LBS | TEMPERATURE: 97.8 F

## 2024-02-06 DIAGNOSIS — R63.5 WEIGHT GAIN: ICD-10-CM

## 2024-02-06 DIAGNOSIS — M54.2 NECK PAIN: Primary | ICD-10-CM

## 2024-02-06 PROCEDURE — G8427 DOCREV CUR MEDS BY ELIG CLIN: HCPCS | Performed by: FAMILY MEDICINE

## 2024-02-06 PROCEDURE — 99214 OFFICE O/P EST MOD 30 MIN: CPT | Performed by: FAMILY MEDICINE

## 2024-02-06 PROCEDURE — 90674 CCIIV4 VAC NO PRSV 0.5 ML IM: CPT | Performed by: FAMILY MEDICINE

## 2024-02-06 PROCEDURE — 90471 IMMUNIZATION ADMIN: CPT | Performed by: FAMILY MEDICINE

## 2024-02-06 PROCEDURE — 1036F TOBACCO NON-USER: CPT | Performed by: FAMILY MEDICINE

## 2024-02-06 PROCEDURE — G8482 FLU IMMUNIZE ORDER/ADMIN: HCPCS | Performed by: FAMILY MEDICINE

## 2024-02-06 PROCEDURE — G8417 CALC BMI ABV UP PARAM F/U: HCPCS | Performed by: FAMILY MEDICINE

## 2024-02-06 RX ORDER — CYCLOBENZAPRINE HCL 10 MG
10 TABLET ORAL 3 TIMES DAILY PRN
Qty: 30 TABLET | Refills: 2 | Status: SHIPPED | OUTPATIENT
Start: 2024-02-06 | End: 2024-02-16

## 2024-02-06 RX ORDER — PHENTERMINE HYDROCHLORIDE 15 MG/1
15 CAPSULE ORAL EVERY MORNING
Qty: 30 CAPSULE | Refills: 0 | Status: SHIPPED | OUTPATIENT
Start: 2024-02-06 | End: 2024-03-07

## 2024-02-06 NOTE — TELEPHONE ENCOUNTER
Pt was checking out and we both saw that she is scheduled for a physical with you on 02/22/2024 at 1 pm. She wanted to know if she needed to be seen again for the physical on that day?    Please advise

## 2024-02-06 NOTE — PROGRESS NOTES
2/8/2024    Chief Complaint   Patient presents with    Other     Pt wanted to discuss neck pain and weight loss options   Pt is agreeable to FIT test, test given   Pt would also like referral to GYN        HPI    Rocio Gates is a 44 y.o. patient that presents today for:    Cervical Radiculopathy: She complains of bilateral arm pain. She describes the pain as aching and throbbing.  Pain seems to radiate from neck down arm. She has numbness in the fingers.  She does not have had weakness in his strength in the affected arm/hand.     Obesity: Here today for chronic, persistent obesity. Would like to discuss weight management. Cites health, increased physical ability, self-image as reasons for wanting to lose weight. Has attempted many months of lifestyle modification. Patient has obesity (BMI >= 30 kg/m2) co-morbidities contributed by obesity.  Has been on phentermine in the past.  States she lost 15 pounds over 3-month time span    Obesity History  Patient has lost 30 lbs with lifestyle modification.   Lowest adult weight: 162 (34)  Highest adult weight: 200  Starting BMI: 31.57  Current BMI: body mass index is 31.57 kg/m².  Goal Weight: 160    Current Exercise Habits none              Patient's past medical, surgical, social and/or family history reviewed, updated in chart, and are non-contributory (unless otherwise stated).  Medications and allergies also reviewed and updated in chart.     ROS negative unless otherwise specified    Physical Exam  Temp Readings from Last 3 Encounters:   02/06/24 97.8 °F (36.6 °C) (Temporal)   02/21/23 97.7 °F (36.5 °C)   08/09/21 97.2 °F (36.2 °C)     Wt Readings from Last 3 Encounters:   02/06/24 88.7 kg (195 lb 9.6 oz)   10/23/23 86.6 kg (191 lb)   10/02/23 86.6 kg (191 lb)     BP Readings from Last 3 Encounters:   02/06/24 122/74   02/21/23 136/84   08/09/21 114/80     Pulse Readings from Last 3 Encounters:   02/06/24 74   02/21/23 88   08/09/21 98       General

## 2024-02-06 NOTE — PATIENT INSTRUCTIONS
Look up \"Dr. Knowles, Physical therapist\" on YouTube. She has multiple neck, back, full body exercises to help with discomfort. DO these exercises 3-4 times weekly to stay limber.        Schedule with Gynecologist:      [facility]  Dr. Nohemi Oseguera  [address] [address]  Ph: [phone]  Fax: [phone]    Dr. Idalia Tracy  [address] [place] [address] [phone]    Dr. Angelica Ann  [address] [place] [address] [phone]    Dr. Carlos Sandhu  [address] [place] [address] [phone]    [facility]   [address] [place] [address]   [phone]     Dr. Dony Sosa  [address] [place] [address]

## 2024-03-11 ENCOUNTER — OFFICE VISIT (OUTPATIENT)
Dept: FAMILY MEDICINE CLINIC | Age: 44
End: 2024-03-11
Payer: COMMERCIAL

## 2024-03-11 VITALS
TEMPERATURE: 98.7 F | SYSTOLIC BLOOD PRESSURE: 120 MMHG | DIASTOLIC BLOOD PRESSURE: 86 MMHG | HEART RATE: 84 BPM | RESPIRATION RATE: 16 BRPM | WEIGHT: 194.6 LBS | HEIGHT: 66 IN | BODY MASS INDEX: 31.27 KG/M2 | OXYGEN SATURATION: 99 %

## 2024-03-11 DIAGNOSIS — R63.5 WEIGHT GAIN: Primary | ICD-10-CM

## 2024-03-11 PROCEDURE — 1036F TOBACCO NON-USER: CPT | Performed by: FAMILY MEDICINE

## 2024-03-11 PROCEDURE — G8427 DOCREV CUR MEDS BY ELIG CLIN: HCPCS | Performed by: FAMILY MEDICINE

## 2024-03-11 PROCEDURE — 99213 OFFICE O/P EST LOW 20 MIN: CPT | Performed by: FAMILY MEDICINE

## 2024-03-11 PROCEDURE — G8482 FLU IMMUNIZE ORDER/ADMIN: HCPCS | Performed by: FAMILY MEDICINE

## 2024-03-11 PROCEDURE — G8417 CALC BMI ABV UP PARAM F/U: HCPCS | Performed by: FAMILY MEDICINE

## 2024-03-11 RX ORDER — PHENTERMINE HYDROCHLORIDE 30 MG/1
30 CAPSULE ORAL EVERY MORNING
Qty: 30 CAPSULE | Refills: 0 | Status: SHIPPED | OUTPATIENT
Start: 2024-03-11 | End: 2024-04-10

## 2024-03-11 RX ORDER — PHENTERMINE HYDROCHLORIDE 15 MG/1
15 CAPSULE ORAL EVERY MORNING
COMMUNITY
End: 2024-03-11

## 2024-03-11 RX ORDER — CYCLOBENZAPRINE HCL 10 MG
10 TABLET ORAL 3 TIMES DAILY PRN
COMMUNITY

## 2024-03-11 SDOH — ECONOMIC STABILITY: INCOME INSECURITY: HOW HARD IS IT FOR YOU TO PAY FOR THE VERY BASICS LIKE FOOD, HOUSING, MEDICAL CARE, AND HEATING?: NOT HARD AT ALL

## 2024-03-11 SDOH — ECONOMIC STABILITY: FOOD INSECURITY: WITHIN THE PAST 12 MONTHS, THE FOOD YOU BOUGHT JUST DIDN'T LAST AND YOU DIDN'T HAVE MONEY TO GET MORE.: NEVER TRUE

## 2024-03-11 SDOH — ECONOMIC STABILITY: FOOD INSECURITY: WITHIN THE PAST 12 MONTHS, YOU WORRIED THAT YOUR FOOD WOULD RUN OUT BEFORE YOU GOT MONEY TO BUY MORE.: NEVER TRUE

## 2024-03-11 NOTE — PROGRESS NOTES
3/13/2024    Chief Complaint   Patient presents with    1 Month Follow-Up     Pt following up for phentermine        HPI    Rocio Gates is a 44 y.o. patient that presents today for:    Obesity: Here today for chronic, persistent obesity. Would like to discuss weight management. Cites health, increased physical ability, self-image as reasons for wanting to lose weight. Has attempted many months of lifestyle modification. Patient has obesity (BMI >= 30 kg/m2) co-morbidities contributed by obesity.  Has been on phentermine in the past.  States she lost 15 pounds over 3-month time span     Obesity History  Patient has lost 30 lbs with lifestyle modification.   Lowest adult weight: 162 (36)  Highest adult weight: 200  Starting BMI: 31.57  Current BMI: body mass index is 31.57 kg/m².  Goal Weight: 160     Current Exercise Habits none    Patient's past medical, surgical, social and/or family history reviewed, updated in chart, and are non-contributory (unless otherwise stated).  Medications and allergies also reviewed and updated in chart.     ROS negative unless otherwise specified    Physical Exam  Temp Readings from Last 3 Encounters:   03/11/24 98.7 °F (37.1 °C) (Temporal)   02/06/24 97.8 °F (36.6 °C) (Temporal)   02/21/23 97.7 °F (36.5 °C)     Wt Readings from Last 3 Encounters:   03/11/24 88.3 kg (194 lb 9.6 oz)   02/06/24 88.7 kg (195 lb 9.6 oz)   10/23/23 86.6 kg (191 lb)     BP Readings from Last 3 Encounters:   03/11/24 120/86   02/06/24 122/74   02/21/23 136/84     Pulse Readings from Last 3 Encounters:   03/11/24 84   02/06/24 74   02/21/23 88       General appearance: alert, well appearing, and in no distress, oriented to person, place, and time and normal appearing weight.    CVS exam: normal rate, regular rhythm, normal S1, S2, no murmurs, rubs, clicks or gallops.  Radial pulses 2+ bilateral.  PT/DP pulse 2+ bilat.  No C/C/E    Chest: clear to auscultation, no wheezes, rales or rhonchi,

## 2024-04-16 DIAGNOSIS — R63.5 WEIGHT GAIN: ICD-10-CM

## 2024-04-17 RX ORDER — PHENTERMINE HYDROCHLORIDE 30 MG/1
30 CAPSULE ORAL EVERY MORNING
Qty: 30 CAPSULE | Refills: 0 | Status: SHIPPED | OUTPATIENT
Start: 2024-04-17 | End: 2024-05-17

## 2024-05-29 DIAGNOSIS — R63.5 WEIGHT GAIN: ICD-10-CM

## 2024-05-29 RX ORDER — PHENTERMINE HYDROCHLORIDE 30 MG/1
CAPSULE ORAL
Qty: 30 CAPSULE | Refills: 0 | Status: SHIPPED | OUTPATIENT
Start: 2024-05-29 | End: 2024-06-28

## 2024-05-31 RX ORDER — CYCLOBENZAPRINE HCL 10 MG
10 TABLET ORAL 3 TIMES DAILY PRN
Qty: 90 TABLET | Refills: 0 | Status: SHIPPED | OUTPATIENT
Start: 2024-05-31

## 2024-07-17 ENCOUNTER — OFFICE VISIT (OUTPATIENT)
Dept: FAMILY MEDICINE CLINIC | Age: 44
End: 2024-07-17
Payer: COMMERCIAL

## 2024-07-17 DIAGNOSIS — R63.2 BINGE EATING: ICD-10-CM

## 2024-07-17 DIAGNOSIS — Z00.00 ANNUAL PHYSICAL EXAM: ICD-10-CM

## 2024-07-17 DIAGNOSIS — R63.5 WEIGHT GAIN: ICD-10-CM

## 2024-07-17 DIAGNOSIS — E78.5 DYSLIPIDEMIA: ICD-10-CM

## 2024-07-17 DIAGNOSIS — R63.5 WEIGHT GAIN: Primary | ICD-10-CM

## 2024-07-17 LAB
ALBUMIN: 4.5 G/DL (ref 3.5–5.2)
ALP BLD-CCNC: 53 U/L (ref 35–104)
ALT SERPL-CCNC: 24 U/L (ref 0–32)
ANION GAP SERPL CALCULATED.3IONS-SCNC: 12 MMOL/L (ref 7–16)
AST SERPL-CCNC: 25 U/L (ref 0–31)
BASOPHILS ABSOLUTE: 0.05 K/UL (ref 0–0.2)
BASOPHILS RELATIVE PERCENT: 1 % (ref 0–2)
BILIRUB SERPL-MCNC: 0.4 MG/DL (ref 0–1.2)
BUN BLDV-MCNC: 11 MG/DL (ref 6–20)
CALCIUM SERPL-MCNC: 9.5 MG/DL (ref 8.6–10.2)
CHLORIDE BLD-SCNC: 100 MMOL/L (ref 98–107)
CHOLESTEROL, TOTAL: 175 MG/DL
CO2: 23 MMOL/L (ref 22–29)
CREAT SERPL-MCNC: 0.6 MG/DL (ref 0.5–1)
EOSINOPHILS ABSOLUTE: 0.06 K/UL (ref 0.05–0.5)
EOSINOPHILS RELATIVE PERCENT: 1 % (ref 0–6)
GFR, ESTIMATED: >90 ML/MIN/1.73M2
GLUCOSE BLD-MCNC: 87 MG/DL (ref 74–99)
HCT VFR BLD CALC: 44 % (ref 34–48)
HDLC SERPL-MCNC: 44 MG/DL
HEMOGLOBIN: 14.1 G/DL (ref 11.5–15.5)
IMMATURE GRANULOCYTES %: 0 % (ref 0–5)
IMMATURE GRANULOCYTES ABSOLUTE: <0.03 K/UL (ref 0–0.58)
LDL CHOLESTEROL: 112 MG/DL
LYMPHOCYTES ABSOLUTE: 2.17 K/UL (ref 1.5–4)
LYMPHOCYTES RELATIVE PERCENT: 26 % (ref 20–42)
MCH RBC QN AUTO: 30.1 PG (ref 26–35)
MCHC RBC AUTO-ENTMCNC: 32 G/DL (ref 32–34.5)
MCV RBC AUTO: 94 FL (ref 80–99.9)
MONOCYTES ABSOLUTE: 0.53 K/UL (ref 0.1–0.95)
MONOCYTES RELATIVE PERCENT: 6 % (ref 2–12)
NEUTROPHILS ABSOLUTE: 5.4 K/UL (ref 1.8–7.3)
NEUTROPHILS RELATIVE PERCENT: 66 % (ref 43–80)
PDW BLD-RTO: 12.6 % (ref 11.5–15)
PLATELET # BLD: 298 K/UL (ref 130–450)
PMV BLD AUTO: 10.8 FL (ref 7–12)
POTASSIUM SERPL-SCNC: 4.9 MMOL/L (ref 3.5–5)
RBC # BLD: 4.68 M/UL (ref 3.5–5.5)
SODIUM BLD-SCNC: 135 MMOL/L (ref 132–146)
TOTAL PROTEIN: 7.8 G/DL (ref 6.4–8.3)
TRIGL SERPL-MCNC: 94 MG/DL
TSH SERPL DL<=0.05 MIU/L-ACNC: 1.51 UIU/ML (ref 0.27–4.2)
VLDLC SERPL CALC-MCNC: 19 MG/DL
WBC # BLD: 8.2 K/UL (ref 4.5–11.5)

## 2024-07-17 PROCEDURE — 1036F TOBACCO NON-USER: CPT | Performed by: FAMILY MEDICINE

## 2024-07-17 PROCEDURE — G8417 CALC BMI ABV UP PARAM F/U: HCPCS | Performed by: FAMILY MEDICINE

## 2024-07-17 PROCEDURE — 99214 OFFICE O/P EST MOD 30 MIN: CPT | Performed by: FAMILY MEDICINE

## 2024-07-17 PROCEDURE — G8427 DOCREV CUR MEDS BY ELIG CLIN: HCPCS | Performed by: FAMILY MEDICINE

## 2024-07-17 RX ORDER — TOPIRAMATE 25 MG/1
25 TABLET ORAL 2 TIMES DAILY
Qty: 60 TABLET | Refills: 3 | Status: SHIPPED
Start: 2024-07-17 | End: 2024-07-17

## 2024-07-17 RX ORDER — TOPIRAMATE 25 MG/1
25 TABLET ORAL 2 TIMES DAILY
Qty: 180 TABLET | Refills: 1 | Status: SHIPPED | OUTPATIENT
Start: 2024-07-17

## 2024-07-17 RX ORDER — PHENTERMINE HYDROCHLORIDE 37.5 MG/1
37.5 CAPSULE ORAL EVERY MORNING
Qty: 30 CAPSULE | Refills: 0 | Status: SHIPPED | OUTPATIENT
Start: 2024-07-17 | End: 2024-08-16

## 2024-07-17 NOTE — PATIENT INSTRUCTIONS
Consider the following medications:    Contrave  Qsymia  Phentermine  GLP-1 agonists- Mounjaro, Victoza, Trulicity, Ozempic  Metformin  Wellbutrin  Topamax    ** weight loss medications may or may not be covered by your insurance.  Please check your formulary to see what might be offered.    Also, here are some diet tips from obesity medicine specialists:    Rules:  Limit sweets to one day per month  Limit chips to one day month  Limit restaurants (including fast food) to one time every two weeks  Eliminate all sugar additives to coffee and use only low calorie creamers (<25 calories)     Requirements:  Make sure protein intake is at least 70 grams per day (begin using a protein shake - Nectar, Premier, BeneProtein and [employer]NC lean (which is lactose-free) are milk-based options; Nectar, IsoPure Protein Drink, and Protein 2 O are water-based options; Quest (or [employer]) and the [employer] 1 protein bars can also be used, but have less protein in them )  Begin eating 3/4 cup of the original, plain Fiber One cereal daily (start with 1/4 cup daily and every week add 1/4 cup daily until reaching the goal of 3/4 cup daily)  Take one multivitamin every day     Goals:  Eat on a 6\" plate and do not eat seconds  Limit snacks to 2 per day  Divide the food on the plate to 1/4 entree, 1/4 starchy vegetables, 1/4 whole grains and 1/4 non-starch vegetables  Avoid eating 2 hours within bedtime       Count every calorie every day  Limit restaurants (including fast food) to one time every two weeks     Requirements:  Make sure protein intake is at least 65 grams per day (begin using a protein shake - Nectar, Premier, BeneProtein, Ensure Max, Boost Max and [employer] lean (which is lactose-free) are milk-based options; Nectar, IsoPure Protein Drink, and Protein 2 O are water-based options; Quest (or [employer]) and the [employer] 1 protein bars can also be used, but have less protein in them )  Begin eating 3/4 cup of the original, plain Fiber One

## 2024-07-17 NOTE — PROGRESS NOTES
Future  -     Comprehensive Metabolic Panel; Future  -     CBC with Auto Differential; Future  -     CBC with Auto Differential  -     Comprehensive Metabolic Panel  -     Lipid Panel  -     TSH          No follow-ups on file.      Graciela Hart, DO    Call or go to ED immediately if symptoms worsen or persist.    Educational materials and/or home exercises printed for patient's review and were included in patient instructions on his/her After Visit Summary and given to patient at the end of visit.       Counseled regarding above diagnosis, including possible risks and complications,  especially if left uncontrolled.    Counseled regarding the possible side effects, risks, benefits and alternatives to treatment; patient and/or guardian verbalizes understanding, agrees, feels comfortable with and wishes to proceed with above treatment plan.    Advised patient to call with any new medication issues, and read all Rx info from pharmacy to assure aware of all possible risks and side effects of medication before taking.    Reviewed age and gender appropriate health screening exams and vaccinations.  Advised patient regarding importance of keeping up with recommended health maintenance and to schedule as soon as possible if overdue, as this is important in assessing for undiagnosed pathology, especially cancer, as well as protecting against potentially harmful/life threatening disease.      Patient and/or guardian verbalizes understanding and agrees with above counseling, assessment and plan.    All questions answered.

## 2024-07-29 VITALS
BODY MASS INDEX: 31.82 KG/M2 | HEIGHT: 66 IN | OXYGEN SATURATION: 99 % | HEART RATE: 97 BPM | DIASTOLIC BLOOD PRESSURE: 88 MMHG | SYSTOLIC BLOOD PRESSURE: 130 MMHG | WEIGHT: 198 LBS | TEMPERATURE: 97.5 F | RESPIRATION RATE: 16 BRPM

## 2024-08-21 DIAGNOSIS — E78.5 DYSLIPIDEMIA: ICD-10-CM

## 2024-08-21 DIAGNOSIS — R63.5 WEIGHT GAIN: ICD-10-CM

## 2024-08-21 RX ORDER — PHENTERMINE HYDROCHLORIDE 37.5 MG/1
37.5 CAPSULE ORAL EVERY MORNING
Qty: 30 CAPSULE | Refills: 0 | Status: SHIPPED | OUTPATIENT
Start: 2024-08-21 | End: 2024-09-20

## 2024-09-03 RX ORDER — CYCLOBENZAPRINE HCL 10 MG
10 TABLET ORAL 3 TIMES DAILY PRN
Qty: 90 TABLET | Refills: 0 | Status: SHIPPED | OUTPATIENT
Start: 2024-09-03

## 2024-10-02 DIAGNOSIS — E78.5 DYSLIPIDEMIA: ICD-10-CM

## 2024-10-02 DIAGNOSIS — R63.5 WEIGHT GAIN: ICD-10-CM

## 2024-10-03 RX ORDER — PHENTERMINE HYDROCHLORIDE 37.5 MG/1
CAPSULE ORAL
Qty: 30 CAPSULE | Refills: 0 | Status: SHIPPED | OUTPATIENT
Start: 2024-10-03 | End: 2024-11-02

## 2024-12-09 RX ORDER — CYCLOBENZAPRINE HCL 10 MG
10 TABLET ORAL 3 TIMES DAILY PRN
Qty: 90 TABLET | Refills: 0 | Status: SHIPPED | OUTPATIENT
Start: 2024-12-09

## 2025-01-21 ENCOUNTER — OFFICE VISIT (OUTPATIENT)
Dept: FAMILY MEDICINE CLINIC | Age: 45
End: 2025-01-21
Payer: COMMERCIAL

## 2025-01-21 VITALS
SYSTOLIC BLOOD PRESSURE: 100 MMHG | WEIGHT: 186 LBS | BODY MASS INDEX: 30.02 KG/M2 | RESPIRATION RATE: 16 BRPM | HEART RATE: 92 BPM | DIASTOLIC BLOOD PRESSURE: 70 MMHG | TEMPERATURE: 97.1 F | OXYGEN SATURATION: 99 %

## 2025-01-21 DIAGNOSIS — R19.7 DIARRHEA, UNSPECIFIED TYPE: ICD-10-CM

## 2025-01-21 DIAGNOSIS — R19.7 DIARRHEA, UNSPECIFIED TYPE: Primary | ICD-10-CM

## 2025-01-21 LAB
ALBUMIN: 4.8 G/DL (ref 3.5–5.2)
ALP BLD-CCNC: 60 U/L (ref 35–104)
ALT SERPL-CCNC: 22 U/L (ref 0–32)
ANION GAP SERPL CALCULATED.3IONS-SCNC: 19 MMOL/L (ref 7–16)
AST SERPL-CCNC: 20 U/L (ref 0–31)
BASOPHILS ABSOLUTE: 0.04 K/UL (ref 0–0.2)
BASOPHILS RELATIVE PERCENT: 1 % (ref 0–2)
BILIRUB SERPL-MCNC: 0.9 MG/DL (ref 0–1.2)
BUN BLDV-MCNC: 12 MG/DL (ref 6–20)
CALCIUM SERPL-MCNC: 9.8 MG/DL (ref 8.6–10.2)
CHLORIDE BLD-SCNC: 111 MMOL/L (ref 98–107)
CO2: 18 MMOL/L (ref 22–29)
CREAT SERPL-MCNC: 0.9 MG/DL (ref 0.5–1)
EOSINOPHILS ABSOLUTE: 0.11 K/UL (ref 0.05–0.5)
EOSINOPHILS RELATIVE PERCENT: 1 % (ref 0–6)
GFR, ESTIMATED: 84 ML/MIN/1.73M2
GLUCOSE BLD-MCNC: 84 MG/DL (ref 74–99)
HCT VFR BLD CALC: 42.5 % (ref 34–48)
HEMOGLOBIN: 14.3 G/DL (ref 11.5–15.5)
IMMATURE GRANULOCYTES %: 0 % (ref 0–5)
IMMATURE GRANULOCYTES ABSOLUTE: <0.03 K/UL (ref 0–0.58)
LIPASE: 22 U/L (ref 13–60)
LYMPHOCYTES ABSOLUTE: 2.08 K/UL (ref 1.5–4)
LYMPHOCYTES RELATIVE PERCENT: 27 % (ref 20–42)
MAGNESIUM: 2 MG/DL (ref 1.6–2.6)
MCH RBC QN AUTO: 29.8 PG (ref 26–35)
MCHC RBC AUTO-ENTMCNC: 33.6 G/DL (ref 32–34.5)
MCV RBC AUTO: 88.5 FL (ref 80–99.9)
MONOCYTES ABSOLUTE: 0.75 K/UL (ref 0.1–0.95)
MONOCYTES RELATIVE PERCENT: 10 % (ref 2–12)
NEUTROPHILS ABSOLUTE: 4.73 K/UL (ref 1.8–7.3)
NEUTROPHILS RELATIVE PERCENT: 61 % (ref 43–80)
PDW BLD-RTO: 12.3 % (ref 11.5–15)
PLATELET # BLD: 313 K/UL (ref 130–450)
PMV BLD AUTO: 10.6 FL (ref 7–12)
POTASSIUM SERPL-SCNC: 4.5 MMOL/L (ref 3.5–5)
RBC # BLD: 4.8 M/UL (ref 3.5–5.5)
SODIUM BLD-SCNC: 148 MMOL/L (ref 132–146)
TOTAL PROTEIN: 8 G/DL (ref 6.4–8.3)
WBC # BLD: 7.7 K/UL (ref 4.5–11.5)

## 2025-01-21 PROCEDURE — G8427 DOCREV CUR MEDS BY ELIG CLIN: HCPCS | Performed by: PHYSICIAN ASSISTANT

## 2025-01-21 PROCEDURE — G8417 CALC BMI ABV UP PARAM F/U: HCPCS | Performed by: PHYSICIAN ASSISTANT

## 2025-01-21 PROCEDURE — 1036F TOBACCO NON-USER: CPT | Performed by: PHYSICIAN ASSISTANT

## 2025-01-21 PROCEDURE — 99204 OFFICE O/P NEW MOD 45 MIN: CPT | Performed by: PHYSICIAN ASSISTANT

## 2025-01-21 RX ORDER — DICYCLOMINE HYDROCHLORIDE 10 MG/1
10 CAPSULE ORAL 4 TIMES DAILY PRN
Qty: 12 CAPSULE | Refills: 0 | Status: SHIPPED | OUTPATIENT
Start: 2025-01-21

## 2025-01-21 NOTE — PROGRESS NOTES
25  Rocio Gates : 1980 Sex: female  Age 44 y.o.      Subjective:  Chief Complaint   Patient presents with    Diarrhea     Since last Wednesday         HPI:     History of Present Illness  The patient is a 44-year-old female who presents for evaluation of diarrhea.    She has been experiencing persistent diarrhea for the past week, with no signs of improvement. The frequency of her bowel movements ranges from 3 to 5 times daily, often more, depending on her food intake. She reports an episode of diarrhea following the consumption of toast yesterday, which continued throughout the night until she took Pepto-Bismol. Her diet has been limited to bananas, as other foods seem to exacerbate her symptoms. Despite attempts to avoid taking Pepto-Bismol, she continues to experience significant water loss through her stools. She recalls having chills last week but reports no recent fevers or vomiting. She has not traveled internationally recently and has not taken any antibiotics. She describes a general feeling of malaise but reports no specific abdominal or back pain. She has been attempting to maintain hydration, but notes that Pedialyte appears to worsen her symptoms. She consumed Gatorade after taking Pepto-Bismol yesterday and has been diluting Pedialyte with water for consumption today. However, she admits to not drinking large quantities of these fluids.    MEDICATIONS  Current: Pepto-Bismol            ROS:   Unless otherwise stated in this report the patient's positive and negative responses for review of systems for constitutional, eyes, ENT, cardiovascular, respiratory, gastrointestinal, neurological, , musculoskeletal, and integument systems and related systems to the presenting problem are either stated in the history of present illness or were not pertinent or were negative for the symptoms and/or complaints related to the presenting medical problem.  Positives and pertinent negatives as per

## 2025-01-22 DIAGNOSIS — E87.0 HYPERNATREMIA: Primary | ICD-10-CM

## 2025-01-22 NOTE — RESULT ENCOUNTER NOTE
Patient's of laboratory studies showed normal white blood cell count.  Hemoglobin hematocrit were normal.  Platelet count was normal.  The patient had a lipase of 22    Magnesium was 2.0    The patient's CMP did show a sodium that was elevated at 148 and chloride of 111, bicarb was low at 18 anion gap of 19 which may be reflection of hydration status.  I would have the patient continue to push fluids hydrate electrolyte type beverages and would repeat the CMP in 2 to 3 days

## 2025-03-17 RX ORDER — CYCLOBENZAPRINE HCL 10 MG
10 TABLET ORAL 3 TIMES DAILY PRN
Qty: 90 TABLET | Refills: 0 | Status: SHIPPED | OUTPATIENT
Start: 2025-03-17

## 2025-07-14 RX ORDER — CYCLOBENZAPRINE HCL 10 MG
10 TABLET ORAL 3 TIMES DAILY PRN
Qty: 90 TABLET | Refills: 0 | Status: SHIPPED | OUTPATIENT
Start: 2025-07-14